# Patient Record
Sex: FEMALE | Race: WHITE | NOT HISPANIC OR LATINO | Employment: OTHER | URBAN - METROPOLITAN AREA
[De-identification: names, ages, dates, MRNs, and addresses within clinical notes are randomized per-mention and may not be internally consistent; named-entity substitution may affect disease eponyms.]

---

## 2017-01-16 ENCOUNTER — TRANSFERRED RECORDS (OUTPATIENT)
Dept: HEALTH INFORMATION MANAGEMENT | Facility: CLINIC | Age: 67
End: 2017-01-16

## 2017-01-23 ENCOUNTER — TRANSFERRED RECORDS (OUTPATIENT)
Dept: HEALTH INFORMATION MANAGEMENT | Facility: CLINIC | Age: 67
End: 2017-01-23

## 2017-01-27 ENCOUNTER — TRANSFERRED RECORDS (OUTPATIENT)
Dept: HEALTH INFORMATION MANAGEMENT | Facility: CLINIC | Age: 67
End: 2017-01-27

## 2017-06-26 ENCOUNTER — TRANSFERRED RECORDS (OUTPATIENT)
Dept: HEALTH INFORMATION MANAGEMENT | Facility: CLINIC | Age: 67
End: 2017-06-26

## 2017-06-27 ENCOUNTER — TRANSFERRED RECORDS (OUTPATIENT)
Dept: HEALTH INFORMATION MANAGEMENT | Facility: CLINIC | Age: 67
End: 2017-06-27

## 2017-08-21 ENCOUNTER — OFFICE VISIT (OUTPATIENT)
Dept: INTERNAL MEDICINE | Facility: CLINIC | Age: 67
End: 2017-08-21
Payer: COMMERCIAL

## 2017-08-21 VITALS
OXYGEN SATURATION: 98 % | TEMPERATURE: 97.8 F | RESPIRATION RATE: 16 BRPM | HEART RATE: 80 BPM | DIASTOLIC BLOOD PRESSURE: 62 MMHG | BODY MASS INDEX: 24.8 KG/M2 | HEIGHT: 63 IN | SYSTOLIC BLOOD PRESSURE: 98 MMHG | WEIGHT: 140 LBS

## 2017-08-21 DIAGNOSIS — Z00.00 ENCOUNTER FOR ROUTINE ADULT HEALTH EXAMINATION WITHOUT ABNORMAL FINDINGS: Primary | ICD-10-CM

## 2017-08-21 DIAGNOSIS — G43.009 NONINTRACTABLE MIGRAINE, UNSPECIFIED MIGRAINE TYPE: ICD-10-CM

## 2017-08-21 LAB
ANION GAP SERPL CALCULATED.3IONS-SCNC: 9 MMOL/L (ref 3–14)
BUN SERPL-MCNC: 13 MG/DL (ref 7–30)
CALCIUM SERPL-MCNC: 9.4 MG/DL (ref 8.5–10.1)
CHLORIDE SERPL-SCNC: 107 MMOL/L (ref 94–109)
CHOLEST SERPL-MCNC: 190 MG/DL
CO2 SERPL-SCNC: 23 MMOL/L (ref 20–32)
CREAT SERPL-MCNC: 0.79 MG/DL (ref 0.52–1.04)
GFR SERPL CREATININE-BSD FRML MDRD: 73 ML/MIN/1.7M2
GLUCOSE SERPL-MCNC: 102 MG/DL (ref 70–99)
HDLC SERPL-MCNC: 73 MG/DL
LDLC SERPL CALC-MCNC: 102 MG/DL
NONHDLC SERPL-MCNC: 117 MG/DL
POTASSIUM SERPL-SCNC: 4 MMOL/L (ref 3.4–5.3)
SODIUM SERPL-SCNC: 139 MMOL/L (ref 133–144)
TRIGL SERPL-MCNC: 74 MG/DL

## 2017-08-21 PROCEDURE — 36415 COLL VENOUS BLD VENIPUNCTURE: CPT | Performed by: INTERNAL MEDICINE

## 2017-08-21 PROCEDURE — 99396 PREV VISIT EST AGE 40-64: CPT | Performed by: INTERNAL MEDICINE

## 2017-08-21 PROCEDURE — 80048 BASIC METABOLIC PNL TOTAL CA: CPT | Performed by: INTERNAL MEDICINE

## 2017-08-21 PROCEDURE — 80061 LIPID PANEL: CPT | Performed by: INTERNAL MEDICINE

## 2017-08-21 RX ORDER — SUMATRIPTAN 50 MG/1
50 TABLET, FILM COATED ORAL
Qty: 18 TABLET | Refills: 11 | Status: SHIPPED | OUTPATIENT
Start: 2017-08-21 | End: 2021-04-22

## 2017-08-21 NOTE — MR AVS SNAPSHOT
After Visit Summary   8/21/2017    Vilma Hughes    MRN: [id number]           Patient Information     Date Of Birth          1950        Visit Information        Provider Department      8/21/2017 7:40 AM Vilma Grady MD [facility]        Today's Diagnoses     Encounter for routine adult health examination without abnormal findings    -  1      Care Instructions      PREVENTIVE HEALTH RECOMMENDATIONS:     Vaccines: Get a flu shot each year. Get a tetanus shot every 10 years.     Exercise for at least 150 minutes a week (an average of 30 minutes a day, 5 days of the week). This will help you control your weight and prevent disease.    Limit alcohol to one drink per day.    No smoking.     Wear sunscreen to prevent skin cancer.     See your dentist twice a year for an exam and cleaning.    Try to get Calcium 1200 mg total per day. It is best to not take it all at once. Try to get Vitamin D at least 1000 units per day.    BMI or Body Mass Index is a way of indicating weight and health risk for cardiovascular diseases, high blood pressure, diabetes.   Definitions:    Underweight is less than 18.5 and will be associated with health risk.   Normal BMI is 18.5 to 25   Overweight is 25-29   Obesity is 30 or greater   Morbid Obesity is 40 or greater or 35 or greater with diabetes or high blood pressure  Obesity and Morbid Obesity are associated with higher health risks. Lowering calories, exercising more may lower your BMI and even small decreases can have positive impact on lowering health risks.   Your Body mass index is 24.8 kg/(m^2)..             Follow-ups after your visit        Who to contact     If you have questions or need follow up information about today's clinic visit or your schedule please contact [facility] directly at [phone].  Normal or non-critical lab and imaging results will be communicated to you by MyChart, letter or phone within 4 business  "days after the clinic has received the results. If you do not hear from us within 7 days, please contact the clinic through [employer] or phone. If you have a critical or abnormal lab result, we will notify you by phone as soon as possible.  Submit refill requests through Bi[employer] or call your pharmacy and they will forward the refill request to us. Please allow 3 business days for your refill to be completed.          Additional Information About Your Visit        Bioptigen Information     [employer]igen lets you send messages to your doctor, view your test results, renew your prescriptions, schedule appointments and more. To sign up, go to [email or web address] . Click on \"Log in\" on the left side of the screen, which will take you to the Welcome page. Then click on \"Sign up Now\" on the right side of the page.     You will be asked to enter the access code listed below, as well as some personal information. Please follow the directions to create your username and password.     Your access code is: [email or web address]  Expires: 2017  8:11 AM     Your access code will  in 90 days. If you need help or a new code, please call your [place] clinic or [phone].        Care EveryWhere ID     This is your Care EveryWhere ID. This could be used by other organizations to access your [place] medical records  [id number]        Your Vitals Were     Pulse Temperature Respirations Height Last Period Pulse Oximetry    80 97.8  F (36.6  C) (Oral) 16 5' 3\" (1.6 m) 2004 98%    BMI (Body Mass Index)                   24.8 kg/m2            Blood Pressure from Last 3 Encounters:   17 98/62   16 116/72   16 117/63    Weight from Last 3 Encounters:   17 140 lb (63.5 kg)   16 142 lb (64.4 kg)   16 138 lb (62.6 kg)              We Performed the Following     Basic metabolic panel     Lipid panel reflex to direct LDL        Primary Care Provider Office Phone # Fax #    Vilma Grady [phone] " [phone]       [address] [facility] [address]  [facility] [address]        Equal Access to Services     JENNIFERTEJA MERVAT : Hadii aad ku hadjuan pabloo Soannabelali, waaxda luqadaha, qaybta kaalmada adejose, víctor claudiain hayaaniin sonjermaine joiner patti lawrenec. So [facility] [phone].    ATENCIÓN: Si habla español, tiene a waldron disposición servicios gratuitos de asistencia lingüística. Llame al [phone].    We comply with applicable federal civil rights laws and [place] laws. We do not discriminate on the basis of race, color, national origin, age, disability sex, sexual orientation or gender identity.            Thank you!     Thank you for choosing [facility]  for your care. Our goal is always to provide you with excellent care. Hearing back from our patients is one way we can continue to improve our services. Please take a few minutes to complete the written survey that you may receive in the mail after your visit with us. Thank you!             Your Updated Medication List - Protect others around you: Learn how to safely use, store and throw away your medicines at www.disposemymeds.org.          This list is accurate as of: 8/21/17  8:11 AM.  Always use your most recent med list.                   Brand Name Dispense Instructions for use Diagnosis    SUMAtriptan 50 MG tablet    IMITREX    18 tablet    Take 1 tablet (50 mg) by mouth at onset of headache for migraine May repeat dose in 2 hours.  Do not exceed 200 mg in 24 hours    Nonintractable migraine, unspecified migraine type
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
## 2017-08-21 NOTE — PATIENT INSTRUCTIONS
PREVENTIVE HEALTH RECOMMENDATIONS:     Vaccines: Get a flu shot each year. Get a tetanus shot every 10 years.     Exercise for at least 150 minutes a week (an average of 30 minutes a day, 5 days of the week). This will help you control your weight and prevent disease.    Limit alcohol to one drink per day.    No smoking.     Wear sunscreen to prevent skin cancer.     See your dentist twice a year for an exam and cleaning.    Try to get Calcium 1200 mg total per day. It is best to not take it all at once. Try to get Vitamin D at least 1000 units per day.    BMI or Body Mass Index is a way of indicating weight and health risk for cardiovascular diseases, high blood pressure, diabetes.   Definitions:    Underweight is less than 18.5 and will be associated with health risk.   Normal BMI is 18.5 to 25   Overweight is 25-29   Obesity is 30 or greater   Morbid Obesity is 40 or greater or 35 or greater with diabetes or high blood pressure  Obesity and Morbid Obesity are associated with higher health risks. Lowering calories, exercising more may lower your BMI and even small decreases can have positive impact on lowering health risks.   Your Body mass index is 24.8 kg/(m^2)..

## 2017-08-21 NOTE — NURSING NOTE
"Chief Complaint   Patient presents with     Physical     Fasting       Initial BP 98/62  Pulse 80  Temp 97.8  F (36.6  C) (Oral)  Resp 16  Ht 5' 3\" (1.6 m)  Wt 140 lb (63.5 kg)  LMP 08/18/2004  SpO2 98%  BMI 24.8 kg/m2 Estimated body mass index is 24.8 kg/(m^2) as calculated from the following:    Height as of this encounter: 5' 3\" (1.6 m).    Weight as of this encounter: 140 lb (63.5 kg).  Medication Reconciliation: complete      Cristine Allen CMA      "

## 2018-01-03 ENCOUNTER — TELEPHONE (OUTPATIENT)
Dept: INTERNAL MEDICINE | Facility: CLINIC | Age: 68
End: 2018-01-03

## 2018-01-03 DIAGNOSIS — B37.31 YEAST INFECTION OF THE VAGINA: Primary | ICD-10-CM

## 2018-01-03 NOTE — TELEPHONE ENCOUNTER
Vilma Hughes is a 67 year old female who calls with yeast infection.    NURSING ASSESSMENT:  Description:  Vaginal burning, itching, and odor  Precip. factors:  Has been at dentist frequently recently and takes antibx for each visit  Last exam/Treatment:  08/21/17  Allergies:   Allergies   Allergen Reactions     Fentanyl Nausea and Vomiting     No Known Drug Allergies        MEDICATIONS:   Recommends pt start with OTC yeast infection products. Pt reports she'd like the oral treatment of yeast infection. Pt indicates she's had yeast infections in the past and received po treatment then.    NURSING PLAN: Routed to provider Yes     Discuss it may be later today before getting a call back. Pt voices she may go to [facility] if it's bothering her and she hasn't heard back yet. Discuss clinic policy is moving towards more e-visits and [email or web address] use vs calling in rxs. Verbalized understanding.    If further questions/concerns or if symptoms do not improve, worsen or new symptoms develop, call your PCP or [facility] as soon as possible.      Guideline used:  Telephone Triage Protocols for Nurses, Fourth Edition, Keisha Garcia RN

## 2018-01-03 NOTE — TELEPHONE ENCOUNTER
Suggest OTC topical treatment first. If not improved then needs check in clinic and PO according to result / wet prep.

## 2018-01-04 RX ORDER — FLUCONAZOLE 150 MG/1
150 TABLET ORAL ONCE
Qty: 1 TABLET | Refills: 0 | Status: SHIPPED | OUTPATIENT
Start: 2018-01-04 | End: 2018-01-18

## 2018-01-18 ENCOUNTER — TELEPHONE (OUTPATIENT)
Dept: INTERNAL MEDICINE | Facility: CLINIC | Age: 68
End: 2018-01-18

## 2018-01-18 DIAGNOSIS — B37.31 YEAST INFECTION OF THE VAGINA: ICD-10-CM

## 2018-01-18 RX ORDER — FLUCONAZOLE 150 MG/1
150 TABLET ORAL ONCE
Qty: 1 TABLET | Refills: 0 | Status: SHIPPED | OUTPATIENT
Start: 2018-01-18 | End: 2018-01-18

## 2018-01-18 NOTE — TELEPHONE ENCOUNTER
Pt left message that she had to take more antibiotics prescribed by the dentist.     Now she has another vaginal yeast infection.     She is asking for refill of Fluconazole Rx.     Last refill on 1/4/18.     Provider approval needed.

## 2018-02-02 ENCOUNTER — OFFICE VISIT (OUTPATIENT)
Dept: INTERNAL MEDICINE | Facility: CLINIC | Age: 68
End: 2018-02-02
Payer: COMMERCIAL

## 2018-02-02 VITALS
SYSTOLIC BLOOD PRESSURE: 126 MMHG | OXYGEN SATURATION: 97 % | HEART RATE: 80 BPM | BODY MASS INDEX: 25.5 KG/M2 | WEIGHT: 143.9 LBS | TEMPERATURE: 97.8 F | HEIGHT: 63 IN | DIASTOLIC BLOOD PRESSURE: 74 MMHG

## 2018-02-02 DIAGNOSIS — N95.2 ATROPHIC VAGINITIS: ICD-10-CM

## 2018-02-02 DIAGNOSIS — N76.0 VAGINITIS AND VULVOVAGINITIS: Primary | ICD-10-CM

## 2018-02-02 LAB
SPECIMEN SOURCE: NORMAL
WET PREP SPEC: NORMAL

## 2018-02-02 PROCEDURE — 87210 SMEAR WET MOUNT SALINE/INK: CPT | Performed by: INTERNAL MEDICINE

## 2018-02-02 PROCEDURE — 99213 OFFICE O/P EST LOW 20 MIN: CPT | Performed by: INTERNAL MEDICINE

## 2018-02-02 RX ORDER — ESTRADIOL 0.1 MG/G
2 CREAM VAGINAL
Qty: 42.5 G | Refills: 3 | Status: SHIPPED | OUTPATIENT
Start: 2018-02-05 | End: 2019-06-25

## 2018-02-02 NOTE — PROGRESS NOTES
"  SUBJECTIVE:   Vilma Hughes is a 67 year old female who presents to clinic today for the following health issues:    Vaginal irritation: She had been on some antibiotics on and off for some dental issues and had yeast infection that was treated couple times.  She has some continued irritation and is concerned she may still have infection.  She reports that she has had some sensitivity with these tissues including with intercourse.    She states a number of years ago she had some vaginal bleeding and was seen by gynecology, they had been monitoring some lesion in the vagina, eventually it went away and there was no follow-up.  Review of the records reveals she had had a shallow ulcer in the vagina, thought possibly traumatic.  A biopsy was done that showed some chronic inflammation but no other abnormalities.    Patient Active Problem List   Diagnosis     Diffuse cystic mastopathy     Ebstein's anomaly     Advanced directives, counseling/discussion     Nonintractable migraine, unspecified migraine type     .  Current Outpatient Prescriptions   Medication Sig Dispense Refill     SUMAtriptan (IMITREX) 50 MG tablet Take 1 tablet (50 mg) by mouth at onset of headache for migraine May repeat dose in 2 hours.  Do not exceed 200 mg in 24 hours 18 tablet 11      Social History   Substance Use Topics     Smoking status: Never Smoker     Smokeless tobacco: Never Used     Alcohol use Yes      Comment: occasionally      Reviewed and updated as needed this visit by clinical staff  Tobacco  Allergies  Meds  Problems  Med Hx  Surg Hx  Fam Hx  Soc Hx        Reviewed and updated as needed this visit by Provider         ROS:  No discharge    OBJECTIVE:     /74 (BP Location: Right arm, Patient Position: Sitting, Cuff Size: Adult Regular)  Pulse 80  Temp 97.8  F (36.6  C) (Oral)  Ht 5' 3\" (1.6 m)  Wt 143 lb 14.4 oz (65.3 kg)  LMP 08/18/2004  SpO2 97%  BMI 25.49 kg/m2  Body mass index is 25.49 kg/(m^2).    External " genitalia with very mild erythema, thinning vaginal mucosa without significant exudate present, no abnormal lesions, ulcers, bleeding.    Wet prep: Negative      ASSESSMENT/PLAN:             1. Vaginitis and vulvovaginitis    - Wet prep    2. Atrophic vaginitis  Reassured there was no infection.  She likely has atrophic vaginitis, discussed options including estradiol cream.  She would like to try this.  - estradiol (ESTRACE) 0.1 MG/GM cream; Place 2 g vaginally twice a week  Dispense: 42.5 g; Refill: 3        Vilma Grady MD  [facility]

## 2018-02-02 NOTE — NURSING NOTE
"Chief Complaint   Patient presents with     Vaginal Problem       Initial /74 (BP Location: Right arm, Patient Position: Sitting, Cuff Size: Adult Regular)  Pulse 80  Temp 97.8  F (36.6  C) (Oral)  Ht 5' 3\" (1.6 m)  Wt 143 lb 14.4 oz (65.3 kg)  LMP 08/18/2004  SpO2 97%  BMI 25.49 kg/m2 Estimated body mass index is 25.49 kg/(m^2) as calculated from the following:    Height as of this encounter: 5' 3\" (1.6 m).    Weight as of this encounter: 143 lb 14.4 oz (65.3 kg).  Medication Reconciliation: complete    "

## 2018-02-02 NOTE — MR AVS SNAPSHOT
"              After Visit Summary   2018    Vilma Hughes    MRN: [id number]           Patient Information     Date Of Birth          1950        Visit Information        Provider Department      2018 10:20 AM Vilma Grady MD [facility]        Today's Diagnoses     Vaginitis and vulvovaginitis    -  1    Atrophic vaginitis           Follow-ups after your visit        Who to contact     If you have questions or need follow up information about today's clinic visit or your schedule please contact [facility] directly at [phone].  Normal or non-critical lab and imaging results will be communicated to you by rankurhart, letter or phone within 4 business days after the clinic has received the results. If you do not hear from us within 7 days, please contact the clinic through rankurhart or phone. If you have a critical or abnormal lab result, we will notify you by phone as soon as possible.  Submit refill requests through [employer] or call your pharmacy and they will forward the refill request to us. Please allow 3 business days for your refill to be completed.          Additional Information About Your Visit        MyChart Information     W[employer] lets you send messages to your doctor, view your test results, renew your prescriptions, schedule appointments and more. To sign up, go to [email or web address] . Click on \"Log in\" on the left side of the screen, which will take you to the Welcome page. Then click on \"Sign up Now\" on the right side of the page.     You will be asked to enter the access code listed below, as well as some personal information. Please follow the directions to create your username and password.     Your access code is: [email or web address]  Expires: 2018 10:22 AM     Your access code will  in 90 days. If you need help or a new code, please call your [facility] or [phone].        Care EveryWhere ID     This is your Care EveryWhere ID. This " "could be used by other organizations to access your [place] medical records  [id number]        Your Vitals Were     Pulse Temperature Height Last Period Pulse Oximetry BMI (Body Mass Index)    80 97.8  F (36.6  C) (Oral) 5' 3\" (1.6 m) 08/18/2004 97% 25.49 kg/m2       Blood Pressure from Last 3 Encounters:   02/02/18 126/74   08/21/17 98/62   08/19/16 116/72    Weight from Last 3 Encounters:   02/02/18 143 lb 14.4 oz (65.3 kg)   08/21/17 140 lb (63.5 kg)   08/19/16 142 lb (64.4 kg)              We Performed the Following     Wet prep          Today's Medication Changes          These changes are accurate as of 2/2/18 11:59 PM.  If you have any questions, ask your nurse or doctor.               Start taking these medicines.        Dose/Directions    estradiol 0.1 MG/GM cream   Commonly known as:  ESTRACE   Used for:  Atrophic vaginitis   Started by:  Vilma Grady MD        Dose:  2 g   Start taking on:  2/5/2018   Place 2 g vaginally twice a week   Quantity:  42.5 g   Refills:  3            Where to get your medicines      These medications were sent to [facility]/pharmacy [facility][id number] [facility] [address]  [address] [facility] [address]     Phone:  [phone]     estradiol 0.1 MG/GM cream                Primary Care Provider Office Phone # Fax #    Vilma Grady [phone]       [facility] [address] [facility] [address]  [facility] [address]        Equal Access to Services     [facility]: Hadii shahid hilliardo Sorosemary, waaxda luqadaha, qaybta kaalmada adeegyada, waxay idiin hayaan adeeg kharash la'aan . So [facility] [phone].    ATENCIÓN: Si habla español, tiene a waldron disposición servicios gratuitos de asistencia lingüística. Llame al [phone].    We comply with applicable federal civil rights laws and [place] laws. We do not discriminate on the basis of race, color, national origin, age, disability, sex, sexual orientation, or gender identity.            Thank you!     Thank you for " choosing [facility]  for your care. Our goal is always to provide you with excellent care. Hearing back from our patients is one way we can continue to improve our services. Please take a few minutes to complete the written survey that you may receive in the mail after your visit with us. Thank you!             Your Updated Medication List - Protect others around you: Learn how to safely use, store and throw away your medicines at www.disposemymeds.org.          This list is accurate as of 2/2/18 11:59 PM.  Always use your most recent med list.                   Brand Name Dispense Instructions for use Diagnosis    estradiol 0.1 MG/GM cream   Start taking on:  2/5/2018    ESTRACE    42.5 g    Place 2 g vaginally twice a week    Atrophic vaginitis       SUMAtriptan 50 MG tablet    IMITREX    18 tablet    Take 1 tablet (50 mg) by mouth at onset of headache for migraine May repeat dose in 2 hours.  Do not exceed 200 mg in 24 hours    Nonintractable migraine, unspecified migraine type

## 2018-02-04 ENCOUNTER — TELEPHONE (OUTPATIENT)
Dept: INTERNAL MEDICINE | Facility: CLINIC | Age: 68
End: 2018-02-04

## 2018-02-04 ENCOUNTER — E-VISIT (OUTPATIENT)
Dept: INTERNAL MEDICINE | Facility: CLINIC | Age: 68
End: 2018-02-04
Payer: COMMERCIAL

## 2018-02-04 DIAGNOSIS — N39.0 URINARY TRACT INFECTION WITHOUT HEMATURIA, SITE UNSPECIFIED: Primary | ICD-10-CM

## 2018-02-04 PROCEDURE — 99444 ZZC PHYSICIAN ONLINE EVALUATION & MANAGEMENT SERVICE: CPT | Performed by: INTERNAL MEDICINE

## 2018-02-04 NOTE — TELEPHONE ENCOUNTER
Reason for call:  Symptom   Symptom or request: Patient has a UTI    Duration (how long have symptoms been present): Since Friday  Have you been treated for this before? No    Additional comments: Pt took to the test to prove she has the UTI. Leukocytes is the type of UTI she has. Pt asked can you please call in a prescription to the [facility] on file.    Phone number to reach patient:  Home number on file [phone] (home)    Best Time:  Anytime    Can we leave a detailed message on this number?  YES

## 2018-02-05 RX ORDER — NITROFURANTOIN 25; 75 MG/1; MG/1
100 CAPSULE ORAL 2 TIMES DAILY
Qty: 14 CAPSULE | Refills: 0 | Status: SHIPPED | OUTPATIENT
Start: 2018-02-05 | End: 2018-06-07

## 2018-02-27 ENCOUNTER — TRANSFERRED RECORDS (OUTPATIENT)
Dept: HEALTH INFORMATION MANAGEMENT | Facility: CLINIC | Age: 68
End: 2018-02-27

## 2018-04-30 ENCOUNTER — TRANSFERRED RECORDS (OUTPATIENT)
Dept: HEALTH INFORMATION MANAGEMENT | Facility: CLINIC | Age: 68
End: 2018-04-30

## 2018-06-07 ENCOUNTER — OFFICE VISIT (OUTPATIENT)
Dept: INTERNAL MEDICINE | Facility: CLINIC | Age: 68
End: 2018-06-07
Payer: COMMERCIAL

## 2018-06-07 VITALS
DIASTOLIC BLOOD PRESSURE: 62 MMHG | HEART RATE: 79 BPM | HEIGHT: 63 IN | SYSTOLIC BLOOD PRESSURE: 98 MMHG | OXYGEN SATURATION: 100 % | BODY MASS INDEX: 25.78 KG/M2 | TEMPERATURE: 97.7 F | RESPIRATION RATE: 16 BRPM | WEIGHT: 145.5 LBS

## 2018-06-07 DIAGNOSIS — Z23 PNEUMOCOCCAL VACCINATION ADMINISTERED AT CURRENT VISIT: ICD-10-CM

## 2018-06-07 DIAGNOSIS — R39.11 URINARY HESITANCY: ICD-10-CM

## 2018-06-07 DIAGNOSIS — Z13.1 SCREENING FOR DIABETES MELLITUS: ICD-10-CM

## 2018-06-07 DIAGNOSIS — Z00.00 ENCOUNTER FOR ROUTINE ADULT HEALTH EXAMINATION WITHOUT ABNORMAL FINDINGS: Primary | ICD-10-CM

## 2018-06-07 DIAGNOSIS — Z13.6 CARDIOVASCULAR SCREENING; LDL GOAL LESS THAN 130: ICD-10-CM

## 2018-06-07 LAB
ALBUMIN UR-MCNC: NEGATIVE MG/DL
ANION GAP SERPL CALCULATED.3IONS-SCNC: 5 MMOL/L (ref 3–14)
APPEARANCE UR: CLEAR
BACTERIA #/AREA URNS HPF: ABNORMAL /HPF
BILIRUB UR QL STRIP: NEGATIVE
BUN SERPL-MCNC: 12 MG/DL (ref 7–30)
CALCIUM SERPL-MCNC: 9.3 MG/DL (ref 8.5–10.1)
CHLORIDE SERPL-SCNC: 107 MMOL/L (ref 94–109)
CHOLEST SERPL-MCNC: 196 MG/DL
CO2 SERPL-SCNC: 27 MMOL/L (ref 20–32)
COLOR UR AUTO: YELLOW
CREAT SERPL-MCNC: 0.84 MG/DL (ref 0.52–1.04)
GFR SERPL CREATININE-BSD FRML MDRD: 68 ML/MIN/1.7M2
GLUCOSE SERPL-MCNC: 105 MG/DL (ref 70–99)
GLUCOSE UR STRIP-MCNC: NEGATIVE MG/DL
HDLC SERPL-MCNC: 64 MG/DL
HGB UR QL STRIP: ABNORMAL
KETONES UR STRIP-MCNC: NEGATIVE MG/DL
LDLC SERPL CALC-MCNC: 121 MG/DL
LEUKOCYTE ESTERASE UR QL STRIP: NEGATIVE
NITRATE UR QL: NEGATIVE
NONHDLC SERPL-MCNC: 132 MG/DL
PH UR STRIP: 6.5 PH (ref 5–7)
POTASSIUM SERPL-SCNC: 4.9 MMOL/L (ref 3.4–5.3)
RBC #/AREA URNS AUTO: ABNORMAL /HPF
SODIUM SERPL-SCNC: 139 MMOL/L (ref 133–144)
SOURCE: ABNORMAL
SP GR UR STRIP: <=1.005 (ref 1–1.03)
SPECIMEN SOURCE: NORMAL
TRIGL SERPL-MCNC: 57 MG/DL
UROBILINOGEN UR STRIP-ACNC: 0.2 EU/DL (ref 0.2–1)
WBC #/AREA URNS AUTO: ABNORMAL /HPF
WET PREP SPEC: NORMAL

## 2018-06-07 PROCEDURE — 99397 PER PM REEVAL EST PAT 65+ YR: CPT | Mod: 25 | Performed by: INTERNAL MEDICINE

## 2018-06-07 PROCEDURE — 87210 SMEAR WET MOUNT SALINE/INK: CPT | Performed by: INTERNAL MEDICINE

## 2018-06-07 PROCEDURE — 90471 IMMUNIZATION ADMIN: CPT | Performed by: INTERNAL MEDICINE

## 2018-06-07 PROCEDURE — 81001 URINALYSIS AUTO W/SCOPE: CPT | Performed by: INTERNAL MEDICINE

## 2018-06-07 PROCEDURE — 80061 LIPID PANEL: CPT | Performed by: INTERNAL MEDICINE

## 2018-06-07 PROCEDURE — 36415 COLL VENOUS BLD VENIPUNCTURE: CPT | Performed by: INTERNAL MEDICINE

## 2018-06-07 PROCEDURE — 90670 PCV13 VACCINE IM: CPT | Performed by: INTERNAL MEDICINE

## 2018-06-07 PROCEDURE — 80048 BASIC METABOLIC PNL TOTAL CA: CPT | Performed by: INTERNAL MEDICINE

## 2018-06-07 ASSESSMENT — ACTIVITIES OF DAILY LIVING (ADL)
CURRENT_FUNCTION: NO ASSISTANCE NEEDED
I_NEED_ASSISTANCE_FOR_THE_FOLLOWING_DAILY_ACTIVITIES:: NO ASSISTANCE IS NEEDED

## 2018-06-07 NOTE — NURSING NOTE
"BP 98/62  Pulse 79  Temp 97.7  F (36.5  C) (Oral)  Resp 16  Ht 5' 3\" (1.6 m)  Wt 145 lb 8 oz (66 kg)  LMP 08/18/2004  SpO2 100%  BMI 25.77 kg/m2    "

## 2018-06-07 NOTE — MR AVS SNAPSHOT
After Visit Summary   6/7/2018    Vilma Hughes    MRN: [id number]           Patient Information     Date Of Birth          1950        Visit Information        Provider Department      6/7/2018 7:40 AM Vilma Grady MD [facility]        Today's Diagnoses     Encounter for routine adult health examination without abnormal findings    -  1    Urinary hesitancy        Screening for diabetes mellitus        CARDIOVASCULAR SCREENING; LDL GOAL LESS THAN 130          Care Instructions    Shingrix is the new shingles vaccine.       PREVENTIVE HEALTH RECOMMENDATIONS:     Vaccines: Get a flu shot each year. Get a tetanus shot every 10 years.     Exercise for at least 150 minutes a week (an average of 30 minutes a day, 5 days of the week). This will help you control your weight and prevent disease.    Limit alcohol to one drink per day.    No smoking.     Wear sunscreen to prevent skin cancer.     See your dentist twice a year for an exam and cleaning.    Try to get Calcium 1200 mg total per day. It is best to not take it all at once. Try to get Vitamin D at least 7809-5647 units per day.    BMI or Body Mass Index is a way of indicating weight and health risk for cardiovascular diseases, high blood pressure, diabetes.   Definitions:    Underweight is less than 18.5 and will be associated with health risk.   Normal BMI is 18.5 to 25   Overweight is 25-29   Obesity is 30 or greater   Morbid Obesity is 40 or greater or 35 or greater with diabetes, prediabetes or abnormal blood sugar, high blood pressure or elevated cholesterol  Obesity and Morbid Obesity are associated with higher health risks. Lowering calories, exercising more may lower your BMI and even small decreases can have positive impact on lowering health risks.   Your Body mass index is 25.77 kg/(m^2)..,              Follow-ups after your visit        Who to contact     If you have questions or need follow up information about  "today's clinic visit or your schedule please contact [facility] directly at [phone].  Normal or non-critical lab and imaging results will be communicated to you by MyChart, letter or phone within 4 business days after the clinic has received the results. If you do not hear from us within 7 days, please contact the clinic through [employer] or phone. If you have a critical or abnormal lab result, we will notify you by phone as soon as possible.  Submit refill requests through [employer] or call your pharmacy and they will forward the refill request to us. Please allow 3 business days for your refill to be completed.          Additional Information About Your Visit        [employer] Information     [employer] gives you secure access to your electronic health record. If you see a primary care provider, you can also send messages to your care team and make appointments. If you have questions, please call your primary care clinic.  If you do not have a primary care provider, please call [phone] and they will assist you.        Care EveryWhere ID     This is your Care EveryWhere ID. This could be used by other organizations to access your [facility] medical records  [id number]        Your Vitals Were     Pulse Temperature Respirations Height Last Period Pulse Oximetry    79 97.7  F (36.5  C) (Oral) 16 5' 3\" (1.6 m) 08/18/2004 100%    BMI (Body Mass Index)                   25.77 kg/m2            Blood Pressure from Last 3 Encounters:   06/07/18 98/62   02/02/18 126/74   08/21/17 98/62    Weight from Last 3 Encounters:   06/07/18 145 lb 8 oz (66 kg)   02/02/18 143 lb 14.4 oz (65.3 kg)   08/21/17 140 lb (63.5 kg)              We Performed the Following     Basic metabolic panel     Lipid panel reflex to direct LDL Fasting     UA reflex to Microscopic and Culture     Wet prep          Today's Medication Changes          These changes are accurate as of 6/7/18  8:20 AM.  If you have any questions, ask your " nurse or doctor.               Stop taking these medicines if you haven't already. Please contact your care team if you have questions.     nitroFURantoin (macrocrystal-monohydrate) 100 MG capsule   Commonly known as:  MACROBID   Stopped by:  Vilma Grady MD                    Primary Care Provider Office Phone # Fax #    Vilma Grady [phone]       [address] [facility] [address]  [facility] [address]        Equal Access to Services     [facility]: Hadii aad ku hadasho Soomaali, waaxda luqadaha, qaybta kaalmada adeegyada, waxay idiin hayaan adeeg kharash la'violettan . So [facility] [phone].    ATENCIÓN: Si habla español, tiene a waldron disposición servicios gratuitos de asistencia lingüística. [facility] [phone].    We comply with applicable federal civil rights laws and [place] laws. We do not discriminate on the basis of race, color, national origin, age, disability, sex, sexual orientation, or gender identity.            Thank you!     Thank you for choosing [facility]  for your care. Our goal is always to provide you with excellent care. Hearing back from our patients is one way we can continue to improve our services. Please take a few minutes to complete the written survey that you may receive in the mail after your visit with us. Thank you!             Your Updated Medication List - Protect others around you: Learn how to safely use, store and throw away your medicines at www.disposemymeds.org.          This list is accurate as of 6/7/18  8:20 AM.  Always use your most recent med list.                   Brand Name Dispense Instructions for use Diagnosis    estradiol 0.1 MG/GM cream    ESTRACE    42.5 g    Place 2 g vaginally twice a week    Atrophic vaginitis       SUMAtriptan 50 MG tablet    IMITREX    18 tablet    Take 1 tablet (50 mg) by mouth at onset of headache for migraine May repeat dose in 2 hours.  Do not exceed 200 mg in 24 hours    Nonintractable migraine, unspecified  migraine type

## 2018-06-07 NOTE — PATIENT INSTRUCTIONS
Shingrix is the new shingles vaccine.       PREVENTIVE HEALTH RECOMMENDATIONS:     Vaccines: Get a flu shot each year. Get a tetanus shot every 10 years.     Exercise for at least 150 minutes a week (an average of 30 minutes a day, 5 days of the week). This will help you control your weight and prevent disease.    Limit alcohol to one drink per day.    No smoking.     Wear sunscreen to prevent skin cancer.     See your dentist twice a year for an exam and cleaning.    Try to get Calcium 1200 mg total per day. It is best to not take it all at once. Try to get Vitamin D at least 9111-9460 units per day.    BMI or Body Mass Index is a way of indicating weight and health risk for cardiovascular diseases, high blood pressure, diabetes.   Definitions:    Underweight is less than 18.5 and will be associated with health risk.   Normal BMI is 18.5 to 25   Overweight is 25-29   Obesity is 30 or greater   Morbid Obesity is 40 or greater or 35 or greater with diabetes, prediabetes or abnormal blood sugar, high blood pressure or elevated cholesterol  Obesity and Morbid Obesity are associated with higher health risks. Lowering calories, exercising more may lower your BMI and even small decreases can have positive impact on lowering health risks.   Your Body mass index is 25.77 kg/(m^2)..,

## 2018-06-07 NOTE — PROGRESS NOTES
SUBJECTIVE:   Vilma Hughes is a 67 year old female who presents for Preventive Visit.  Are you in the first 12 months of your Medicare coverage?  No    Physical   Annual:     Getting at least 3 servings of Calcium per day::  Yes    Bi-annual eye exam::  Yes    Dental care twice a year::  Yes    Sleep apnea or symptoms of sleep apnea::  Daytime drowsiness    Diet::  Regular (no restrictions)    Taking medications regularly::  Yes    Medication side effects::  Not applicable    Additional concerns today::  No    Ability to successfully perform activities of daily living: no assistance needed  Home Safety:  Throw rugs in the hallway and lack of grab bars in the bathroom  Hearing Impairment: feel that people are mumbling or not speaking clearly    Current concerns:   1. Having some     Fall risk:  Fallen 2 or more times in the past year?: No  Any fall with injury in the past year?: No    COGNITIVE SCREEN  1) Repeat 3 items (Banana, Sunrise, Chair)    2) Clock draw: ABNORMAL first place numbers incorrectly and then pelon a new clock but place the hands at 11:55.  3) 3 item recall: Recalls 2 objects   Results: ABNORMAL clock, 1-2 items recalled: PROBABLE COGNITIVE IMPAIRMENT, **INFORM PROVIDER**    Mini-CogTM Copyright S Derek. Licensed by the author for use in [facility]; reprinted with permission (soob@.[facility]). All rights reserved.      Reviewed, no concerns     Reviewed and updated as needed this visit by clinical staff         Reviewed and updated as needed this visit by Provider        Social History   Substance Use Topics     Smoking status: Never Smoker     Smokeless tobacco: Never Used     Alcohol use Yes      Comment: occasionally       Alcohol Use 6/7/2018   If you drink alcohol do you typically have greater than 3 drinks per day OR greater than 7 drinks per week? No   No flowsheet data found.          Today's PHQ-2 Score:   PHQ-2 ( 1999 Pfizer) 6/7/2018   Q1: Little interest or pleasure in doing  things 0   Q2: Feeling down, depressed or hopeless 0   PHQ-2 Score 0   Q1: Little interest or pleasure in doing things Not at all   Q2: Feeling down, depressed or hopeless Not at all   PHQ-2 Score 0       Do you feel safe in your environment - Yes    Do you have a Health Care Directive?: No: Advance care planning was reviewed with patient; patient declined at this time.    Current providers sharing in care for this patient include:   Patient Care Team:  Vilma Grady MD as PCP - General (Internal Medicine)    The following health maintenance items are reviewed in Epic and correct as of today:  Health Maintenance   Topic Date Due     ADVANCE DIRECTIVE PLANNING Q5 YRS  08/14/2017     PNEUMOCOCCAL (1 of 2 - PCV13) 11/17/2017     FALL RISK ASSESSMENT  02/02/2019     MAMMO Q2 YR  06/26/2019     TETANUS IMMUNIZATION (SYSTEM ASSIGNED)  01/10/2021     LIPID SCREEN Q5 YR FEMALE (SYSTEM ASSIGNED)  08/21/2022     COLONOSCOPY Q5 YR  04/30/2023     MIGRAINE ACTION PLAN  Completed     DEXA SCAN SCREENING (SYSTEM ASSIGNED)  Completed     HIV SCREEN (SYSTEM ASSIGNED)  Addressed     INFLUENZA VACCINE  Completed     HEPATITIS C SCREENING  Completed       Review of Systems    Physical Exam    Patient Active Problem List   Diagnosis     Diffuse cystic mastopathy     Ebstein's anomaly     Advanced directives, counseling/discussion     Nonintractable migraine, unspecified migraine type     Current Outpatient Prescriptions   Medication Sig Dispense Refill     estradiol (ESTRACE) 0.1 MG/GM cream Place 2 g vaginally twice a week (Patient not taking: Reported on 6/7/2018) 42.5 g 3     SUMAtriptan (IMITREX) 50 MG tablet Take 1 tablet (50 mg) by mouth at onset of headache for migraine May repeat dose in 2 hours.  Do not exceed 200 mg in 24 hours 18 tablet 11        Review Of Systems  Skin: negative, sees derm  Eyes: negative  Ears/Nose/Throat: negative  Respiratory: No dyspnea on exertion and No cough  Cardiovascular:  "negative  Gastrointestinal: negative  Genitourinary: as above  Musculoskeletal: negative  Neurologic: negative  Psychiatric: negative  Hematologic/Lymphatic/Immunologic: negative  Endocrine: negative   Gynecologic ros reveals:no breast pain or new or enlarging lumps on self exam, no vaginal bleeding, no discharge or pelvic pain    Objective:  Patient alert, in no acute distress  BP 98/62  Pulse 79  Temp 97.7  F (36.5  C) (Oral)  Resp 16  Ht 5' 3\" (1.6 m)  Wt 145 lb 8 oz (66 kg)  LMP 08/18/2004  SpO2 100%  BMI 25.77 kg/m2    HEENT: extraocular movements are intact, pupils equal and reactive to light and accommodation, TMs clear, oropharynx clear  NECK: Neck supple. No adenopathy. Thyroid symmetric, normal size,, Carotids without bruits.  PULMONARY: clear to auscultation  CARDIAC: regular rate and rhythm and no murmurs, clicks, or gallops  PULSES: 2/2 throughout  BACK: no spinal or CVAT  ABDOMINAL: Soft, nontender.  Normal bowel sounds.  No hepatosplenomegaly or abnormal masses  BREAST: No breast masses or tenderness, No axillary masses or tenderness and No galactorrhea  PELVIC: external genitalia with some atrophy, vaginal mucosa  thin, no changes at urethra, cervix normal, specimen sent for wet prep  REFLEXES: 2+ throughout  SKIN: unremarkable         ASSESSMENT / PLAN:   1. Encounter for routine adult health examination without abnormal findings    - Urine Microscopic    2. Urinary hesitancy  Check labs, consider urology  - UA reflex to Microscopic and Culture  - Wet prep    3. Screening for diabetes mellitus    - Basic metabolic panel    4. CARDIOVASCULAR SCREENING; LDL GOAL LESS THAN 130    - Lipid panel reflex to direct LDL Fasting    End of Life Planning:  Patient currently has an advanced directive: No.  I have verified the patient's ablity to prepare an advanced directive/make health care decisions.  Literature was provided to assist patient in preparing an advanced directive.    COUNSELING:  Reviewed " "preventive health counseling, as reflected in patient instructions        Estimated body mass index is 25.49 kg/(m^2) as calculated from the following:    Height as of 2/2/18: 5' 3\" (1.6 m).    Weight as of 2/2/18: 143 lb 14.4 oz (65.3 kg).     reports that she has never smoked. She has never used smokeless tobacco.      Appropriate preventive services were discussed with this patient, including applicable screening as appropriate for cardiovascular disease, diabetes, osteopenia/osteoporosis, and glaucoma.  As appropriate for age/gender, discussed screening for colorectal cancer, prostate cancer, breast cancer, and cervical cancer. Checklist reviewing preventive services available has been given to the patient.    Reviewed patients plan of care and provided an AVS. The Basic Care Plan (routine screening as documented in Health Maintenance) for Vilma meets the Care Plan requirement. This Care Plan has been established and reviewed with the Patient.    Counseling Resources:  ATP IV Guidelines  Pooled Cohorts Equation Calculator  Breast Cancer Risk Calculator  FRAX Risk Assessment  ICSI Preventive Guidelines  Dietary Guidelines for Americans, 2010  Pure [employer]utz's MyPlate  ASA Prophylaxis  Lung CA Screening    Vilma Grady MD  [facility]  Answers for HPI/ROS submitted by the patient on 6/7/2018   PHQ-2 Score: 0    "

## 2018-06-07 NOTE — PROGRESS NOTES
"  SUBJECTIVE:   Vilma Hughes is a 67 year old female who presents for Preventive Visit.  Are you in the first 12 months of your Medicare Part B coverage?  {No Yes:217079::\"No\"}    Healthy Habits:    Do you get at least three servings of calcium containing foods daily (dairy, green leafy vegetables, etc.)? {YES/NO, DAIRY INTAKE:869372::\"yes\"}    Amount of exercise or daily activities, outside of work: {AMOUNT EXERCISE:660680}    Problems taking medications regularly {Yes /No default:444532::\"No\"}    Medication side effects: {Yes /No default.:810799::\"No\"}    Have you had an eye exam in the past two years? {YESNOBLANK:370707}    Do you see a dentist twice per year? {YESNOBLANK:488178}    Do you have sleep apnea, excessive snoring or daytime drowsiness?{YESNOBLANK:715581}      Ability to successfully perform activities of daily living: {YES/NO (MEDICARE):893143::\"Yes, no assistance needed\"}    Home safety:  {IPPE SAFETY CONCERNS:553470::\"none identified\"}     Hearing impairment: {NO/YES:907022}    Fall risk:  {Document Fall Risk in the Assessments Section of the Navigator:187030}    {If any of the above assessments are answered yes, consider ordering appropriate referrals (Optional):819381::\"click delete button to remove this line now\"}    {AWV Cognitive Screenin}    {Outside tests to abstract? :720262}    {additional problems to add (Optional):626395}    Reviewed and updated as needed this visit by clinical staff         Reviewed and updated as needed this visit by Provider        Social History   Substance Use Topics     Smoking status: Never Smoker     Smokeless tobacco: Never Used     Alcohol use Yes      Comment: occasionally       If you drink alcohol do you typically have >3 drinks per day or >7 drinks per week? {ETOH :481365}                        Today's PHQ-2 Score:   PHQ-2 (  Pfizer) 2018   Q1: Little interest or pleasure in doing things 0 0   Q2: Feeling down, depressed or " "hopeless 0 0   PHQ-2 Score 0 0     {PHQ-2 LOOK IN ASSESSMENTS (Optional) :293547}  Do you feel safe in your environment - {YES/NO/NA:670555}    Do you have a Health Care Directive?: {HEALTHCARE DIRECTIVE STATUS:462890}    Current providers sharing in care for this patient include:   Patient Care Team:  Vilma Grady MD as PCP - General (Internal Medicine)    The following health maintenance items are reviewed in Epic and correct as of today:  Health Maintenance   Topic Date Due     HIV SCREEN (SYSTEM ASSIGNED)  1970     ADVANCE DIRECTIVE PLANNING Q5 YRS  2017     PNEUMOCOCCAL (1 of 2 - PCV13) 2017     FALL RISK ASSESSMENT  2019     MAMMO Q2 YR  2019     PAP Q3 YR  2019     TETANUS IMMUNIZATION (SYSTEM ASSIGNED)  01/10/2021     LIPID SCREEN Q5 YR FEMALE (SYSTEM ASSIGNED)  2022     COLONOSCOPY Q5 YR  2023     MIGRAINE ACTION PLAN  Completed     DEXA SCAN SCREENING (SYSTEM ASSIGNED)  Completed     INFLUENZA VACCINE  Completed     HEPATITIS C SCREENING  Completed     {Chronicprobdata (Optional):438536}    {Decision Support (Optional):611735}    ROS:  {ROS COMP:298469}    OBJECTIVE:   LMP 2004 Estimated body mass index is 25.49 kg/(m^2) as calculated from the following:    Height as of 18: 5' 3\" (1.6 m).    Weight as of 18: 143 lb 14.4 oz (65.3 kg).  EXAM:   {Exam :896444}    ASSESSMENT / PLAN:   {Diag Picklist:982402}    End of Life Planning:  Patient currently has an advanced directive: { :647546}    COUNSELING:  {Medicare Counselin}    {BP Counseling- Complete if BP >= 120/80  (Optional):489941}    Estimated body mass index is 25.49 kg/(m^2) as calculated from the following:    Height as of 18: 5' 3\" (1.6 m).    Weight as of 18: 143 lb 14.4 oz (65.3 kg).  {Weight Management Plan -- Complete if patient has an abnormal BMI (Optional):605962}     reports that she has never smoked. She has never used smokeless tobacco.  {Tobacco Cessation -- " Complete if patient is a smoker (Optional):645409}    Appropriate preventive services were discussed with this patient, including applicable screening as appropriate for cardiovascular disease, diabetes, osteopenia/osteoporosis, and glaucoma.  As appropriate for age/gender, discussed screening for colorectal cancer, prostate cancer, breast cancer, and cervical cancer. Checklist reviewing preventive services available has been given to the patient.    Reviewed patients plan of care and provided an AVS. The {CarePlan:516349} for Vilma meets the Care Plan requirement. This Care Plan has been established and reviewed with the {PATIENT, FAMILY MEMBER, CAREGIVER:096409}.    Counseling Resources:  ATP IV Guidelines  Pooled Cohorts Equation Calculator  Breast Cancer Risk Calculator  FRAX Risk Assessment  ICSI Preventive Guidelines  Dietary Guidelines for Americans, 2010  USDA's MyPlate  ASA Prophylaxis  Lung CA Screening    Vilma Grady MD  [facility]

## 2018-06-07 NOTE — NURSING NOTE
Prior to injection verified patient identity using patient's name and date of birth.  Screening Questionnaire for Adult Immunization    Are you sick today?   No   Do you have allergies to medications, food, a vaccine component or latex?   No   Have you ever had a serious reaction after receiving a vaccination?   No   Do you have a long-term health problem with heart disease, lung disease, asthma, kidney disease, metabolic disease (e.g. diabetes), anemia, or other blood disorder?   No   Do you have cancer, leukemia, HIV/AIDS, or any other immune system problem?   No   In the past 3 months, have you taken medications that affect  your immune system, such as prednisone, other steroids, or anticancer drugs; drugs for the treatment of rheumatoid arthritis, Crohn s disease, or psoriasis; or have you had radiation treatments?   No   Have you had a seizure, or a brain or other nervous system problem?   No   During the past year, have you received a transfusion of blood or blood     products, or been given immune (gamma) globulin or antiviral drug?   No   For women: Are you pregnant or is there a chance you could become        pregnant during the next month?   No   Have you received any vaccinations in the past 4 weeks?   No     Immunization questionnaire answers were all negative.   MNVFC doesn't apply on this patient  Per orders of Dr. Grady, injection of Prevnar given by Shameka Allen. Patient instructed to remain in clinic for 20 minutes afterwards, and to report any adverse reaction to me immediately.     Screening performed by Shameka Allen on 5/24/2017 at 11:08 AM.    Patient instructed to remain in clinic for 20 minutes afterwards, and to report any adverse reaction to me immediately.

## 2018-06-28 ENCOUNTER — TRANSFERRED RECORDS (OUTPATIENT)
Dept: HEALTH INFORMATION MANAGEMENT | Facility: CLINIC | Age: 68
End: 2018-06-28

## 2018-07-25 ENCOUNTER — OFFICE VISIT (OUTPATIENT)
Dept: UROLOGY | Facility: CLINIC | Age: 68
End: 2018-07-25
Payer: COMMERCIAL

## 2018-07-25 VITALS — BODY MASS INDEX: 25.69 KG/M2 | HEART RATE: 78 BPM | HEIGHT: 63 IN | WEIGHT: 145 LBS | OXYGEN SATURATION: 98 %

## 2018-07-25 DIAGNOSIS — R39.11 URINARY HESITANCY: Primary | ICD-10-CM

## 2018-07-25 LAB
ALBUMIN UR-MCNC: NEGATIVE MG/DL
APPEARANCE UR: CLEAR
BILIRUB UR QL STRIP: NEGATIVE
COLOR UR AUTO: YELLOW
GLUCOSE UR STRIP-MCNC: NEGATIVE MG/DL
HGB UR QL STRIP: ABNORMAL
KETONES UR STRIP-MCNC: NEGATIVE MG/DL
LEUKOCYTE ESTERASE UR QL STRIP: ABNORMAL
NITRATE UR QL: NEGATIVE
PH UR STRIP: 6 PH (ref 5–7)
RESIDUAL VOLUME (RV) (EXTERNAL): 17
SOURCE: ABNORMAL
SP GR UR STRIP: 1.01 (ref 1–1.03)
UROBILINOGEN UR STRIP-ACNC: 0.2 EU/DL (ref 0.2–1)

## 2018-07-25 PROCEDURE — 81003 URINALYSIS AUTO W/O SCOPE: CPT | Mod: QW | Performed by: PHYSICIAN ASSISTANT

## 2018-07-25 PROCEDURE — 51798 US URINE CAPACITY MEASURE: CPT | Performed by: PHYSICIAN ASSISTANT

## 2018-07-25 PROCEDURE — 99203 OFFICE O/P NEW LOW 30 MIN: CPT | Mod: 25 | Performed by: PHYSICIAN ASSISTANT

## 2018-07-25 RX ORDER — ESTRADIOL 0.1 MG/G
CREAM VAGINAL
Qty: 42.5 G | Refills: 3 | Status: SHIPPED | OUTPATIENT
Start: 2018-07-25 | End: 2019-06-25

## 2018-07-25 ASSESSMENT — PAIN SCALES - GENERAL: PAINLEVEL: NO PAIN (0)

## 2018-07-25 NOTE — PROGRESS NOTES
"CC: Urinary urgency and frequency.    HPI: It is a pleasure to see Vilma Hughes, a 67 year old female asked to be seen in consultation by Dr. Grady for the above. This problem has been going on for 9 months. The patient voids q30 min during the day, nocturia x 0. There is urgency associated with symptoms of urge incontinence. The patient wears protective pads. No leakage with coughing, sneezing, bending at the waist.      Denies any dysuria, gross hematuria, pyuria, sensation of incomplete bladder emptying, needing to strain or Crede to void, history of recent urinary tract infections or kidney stones. The patient does not have any neurologic issues. Denies constipation. Fluid consumption includes \"a lot of water\", coffee and soda.    Past Medical History:   Diagnosis Date     Atypical nevus 8/16     Diffuse cystic mastopathy      Ebstein's anomaly      Palpitations      Spider veins      Thoracic or lumbosacral neuritis or radiculitis, unspecified     L leg neuropathy     Tubular adenoma 1/16     Past Surgical History:   Procedure Laterality Date     COLONOSCOPY N/A 1/29/2016    Procedure: COMBINED COLONOSCOPY, SINGLE OR MULTIPLE BIOPSY/POLYPECTOMY BY BIOPSY;  Surgeon: Margarette Millan MD;  Location:  GI     HC BIOPSY OF BREAST, OPEN INCISIONAL       HC REMOVAL OF TONSILS,<11 Y/O      Tonsils <14y.o.     PELVIS LAPAROSCOPY,DX       Current Outpatient Prescriptions   Medication Sig Dispense Refill     estradiol (ESTRACE VAGINAL) 0.1 MG/GM cream Apply small amount to the vaginal opening and urethra M, W, F q. h.s. 42.5 g 3     estradiol (ESTRACE) 0.1 MG/GM cream Place 2 g vaginally twice a week (Patient not taking: Reported on 6/7/2018) 42.5 g 3     SUMAtriptan (IMITREX) 50 MG tablet Take 1 tablet (50 mg) by mouth at onset of headache for migraine May repeat dose in 2 hours.  Do not exceed 200 mg in 24 hours (Patient not taking: Reported on 7/25/2018) 18 tablet 11     Allergies   Allergen Reactions     " "Fentanyl Nausea and Vomiting     Family History: There is no h/o  malignancy.  There is no h/o urolithiasis.    Social History: The patient does not smoke cigarettes. Denies EtOH and illicit drug use.      ROS: A comprehensive 14 point ROS was obtained and otherwise negative except for that outlined above in the HPI.    PHYSICAL EXAM:   Vitals:    07/25/18 1530   Pulse: 78   SpO2: 98%   Weight: 65.8 kg (145 lb)   Height: 1.6 m (5' 3\")     GENERAL: Well groomed, well developed, well nourished female in NAD.  HEENT: EOMI, AT, NC.  SKIN: Warm to touch, dry.  No visible rashes or lesions on examined areas.  RESP: No increased respiratory effort.   LYMPH: No LE edema.  ABD: Soft, NT, ND.  No CVAT bilaterally.  MS: Full ROM in extremities.  PELVIC:       Examined in dorsal lithotomy position with a speculum.      Normal external genitalia and introitus, atrophic changes.   NEURO: Alert and oriented x 3.  PSYCH: Normal mood and affect, pleasant and agreeable during interview and exam.    PVR: 17 mL.  U/A: trace blood    IMAGING: None      ASSESSMENT/PLAN:  Ms. Vilma Hughes is a 67 year old female with Urinary freq. We discussed potential management options including continued observation, behavioral modifications such as timed voiding, double voiding, pelvic floor physical therapy, medications (anticholinergics or Myrbetriq), intravesical Botox injections, PTNS, InterStim, or additional evaluation with cystoscopy and/or video urodynamics to further evaluate the anatomy and function of the lower urinary tract. The patient has elected to proceed with the following:  - Eliminate bladder irritants  - Estrace cream  - Reviewed behavioral therapies, such as timed voiding, pelvic floor relaxation while voiding and not voiding in a hurry.  Consider possible pelvic floor physical therapy and biofeedback in the future (discussed)  -Consider med management if the above is not helpful    Follow up in 1 month for reassessment.  "     Consider urodynamics and intravesical examination with cystoscopy in the future if the patient fails medication therapy when tried.      I have enjoyed participating in the medical care of this patient.  Please do not hesitate to contact me with any questions or concerns.     Avelina Duran PA-C  [facility]    30 min spent face to face with the patient, >50% of that time spent on counseling and coordination of care of urinary frequency/urgency.

## 2018-07-25 NOTE — NURSING NOTE
Pvr=17  Pt co freq.  Pt has slow flow.  Pt voids and if she sits on the toilet longer she is will go again.  Pt had a UTI this last winter.  RIKA Ryan CMA  Pt has estrace cream, but has never used.  RIKA Ryan CMA

## 2018-07-25 NOTE — MR AVS SNAPSHOT
After Visit Summary   7/25/2018    Vilma Hughes    MRN: [id number]           Patient Information     Date Of Birth          1950        Visit Information        Provider Department      7/25/2018 3:30 PM Avelina Duran PA-C [facility]        Today's Diagnoses     Urinary hesitancy    -  1      Care Instructions    Estrace cream, pea-sized amount to the opening of the vagina and urethra, Mon, Wed, Fri at bedtime.      Below is a list of things that can irritate the bladder and should be avoided:      Caffeinated soft drinks.    Coffee.    Tea.    Chocolate.    Tomato-based foods.    Acidic juices and fruits. (includes cranberry juice)    Alcohol.    Carbonated drinks.    Aspartame/Nutrasweet.               Follow-ups after your visit        Follow-up notes from your care team     Return in about 4 weeks (around 8/22/2018).      Your next 10 appointments already scheduled     Aug 29, 2018  1:00 PM CDT   Return Visit with Avelina Duran PA-C   [facility] ([facility])    [address]  [address]  [facility] [address] [phone]              Who to contact     If you have questions or need follow up information about today's clinic visit or your schedule please contact [facility] directly at [phone].  Normal or non-critical lab and imaging results will be communicated to you by MyChart, letter or phone within 4 business days after the clinic has received the results. If you do not hear from us within 7 days, please contact the clinic through MyChart or phone. If you have a critical or abnormal lab result, we will notify you by phone as soon as possible.  Submit refill requests through [facility] or call your pharmacy and they will forward the refill request to us. Please allow 3 business days for your refill to be  "completed.          Additional Information About Your Visit        [employer] Information     [employer] gives you secure access to your electronic health record. If you see a primary care provider, you can also send messages to your care team and make appointments. If you have questions, please call your primary care clinic.  If you do not have a primary care provider, please call [phone] and they will assist you.        Care EveryWhere ID     This is your Care EveryWhere ID. This could be used by other organizations to access your [place] medical records  [id number]        Your Vitals Were     Pulse Height Last Period Pulse Oximetry BMI (Body Mass Index)       78 1.6 m (5' 3\") 08/18/2004 98% 25.69 kg/m2        Blood Pressure from Last 3 Encounters:   06/07/18 98/62   02/02/18 126/74   08/21/17 98/62    Weight from Last 3 Encounters:   07/25/18 65.8 kg (145 lb)   06/07/18 66 kg (145 lb 8 oz)   02/02/18 65.3 kg (143 lb 14.4 oz)              We Performed the Following     Bladder scan     UA without Microscopic     Urine Micro Urologic Phys          Today's Medication Changes          These changes are accurate as of 7/25/18  4:07 PM.  If you have any questions, ask your nurse or doctor.               These medicines have changed or have updated prescriptions.        Dose/Directions    * estradiol 0.1 MG/GM cream   Commonly known as:  ESTRACE   This may have changed:  Another medication with the same name was added. Make sure you understand how and when to take each.   Used for:  Atrophic vaginitis   Changed by:  Avelina Duran PA-C        Dose:  2 g   Place 2 g vaginally twice a week   Quantity:  42.5 g   Refills:  3       * estradiol 0.1 MG/GM cream   Commonly known as:  ESTRACE VAGINAL   This may have changed:  You were already taking a medication with the same name, and this prescription was added. Make sure you understand how and when to take each.   Used for:  Urinary hesitancy   Changed by:  Renee" Avelina Osborne PA-C        Apply small amount to the vaginal opening and urethra M, W, F q. h.s.   Quantity:  42.5 g   Refills:  3       * Notice:  This list has 2 medication(s) that are the same as other medications prescribed for you. Read the directions carefully, and ask your doctor or other care provider to review them with you.         Where to get your medicines      Some of these will need a paper prescription and others can be bought over the counter.  Ask your nurse if you have questions.     Bring a paper prescription for each of these medications     estradiol 0.1 MG/GM cream                Primary Care Provider Office Phone # Fax #    Vilma Grady [phone]       [address]ika [address] [place] [id number]        Equal Access to Services     CHERELLE CROWELL : Hadii shahid hilliardo Sorosemary, waaxda luqadaha, qaybta kaalmada adeegyada, víctor armstrong . So [facility] [phone].    ATENCIÓN: Si habla español, tiene a waldron disposición servicios gratuitos de asistencia lingüística. Nevaeh al [phone].    We comply with applicable federal civil rights laws and [place] laws. We do not discriminate on the basis of race, color, national origin, age, disability, sex, sexual orientation, or gender identity.            Thank you!     Thank you for choosing [facility] [place]  for your care. Our goal is always to provide you with excellent care. Hearing back from our patients is one way we can continue to improve our services. Please take a few minutes to complete the written survey that you may receive in the mail after your visit with us. Thank you!             Your Updated Medication List - Protect others around you: Learn how to safely use, store and throw away your medicines at www.disposemymeds.org.          This list is accurate as of 7/25/18  4:07 PM.  Always use your most recent med list.                   Brand Name Dispense  Instructions for use Diagnosis    * estradiol 0.1 MG/GM cream    ESTRACE    42.5 g    Place 2 g vaginally twice a week    Atrophic vaginitis       * estradiol 0.1 MG/GM cream    ESTRACE VAGINAL    42.5 g    Apply small amount to the vaginal opening and urethra M, W, F q. h.s.    Urinary hesitancy       SUMAtriptan 50 MG tablet    IMITREX    18 tablet    Take 1 tablet (50 mg) by mouth at onset of headache for migraine May repeat dose in 2 hours.  Do not exceed 200 mg in 24 hours    Nonintractable migraine, unspecified migraine type       * Notice:  This list has 2 medication(s) that are the same as other medications prescribed for you. Read the directions carefully, and ask your doctor or other care provider to review them with you.

## 2018-07-25 NOTE — LETTER
"7/25/2018       RE: Vilma Hughes  [address]  [facility] [address]     Dear Colleague,    Thank you for referring your patient, Vilma Hughes, to the [facility] [place] [facility]. Please see a copy of my visit note below.    CC: Urinary urgency and frequency.    HPI: It is a pleasure to see Vilma Hughes, a 67 year old female asked to be seen in consultation by Dr. Grady for the above. This problem has been going on for 9 months. The patient voids q30 min during the day, nocturia x 0. There is urgency associated with symptoms of urge incontinence. The patient wears protective pads. No leakage with coughing, sneezing, bending at the waist.      Denies any dysuria, gross hematuria, pyuria, sensation of incomplete bladder emptying, needing to strain or Crede to void, history of recent urinary tract infections or kidney stones. The patient does not have any neurologic issues. Denies constipation. Fluid consumption includes \"a lot of water\", coffee and soda.    Past Medical History:   Diagnosis Date     Atypical nevus 8/16     Diffuse cystic mastopathy      Ebstein's anomaly      Palpitations      Spider veins      Thoracic or lumbosacral neuritis or radiculitis, unspecified     L leg neuropathy     Tubular adenoma 1/16     Past Surgical History:   Procedure Laterality Date     COLONOSCOPY N/A 1/29/2016    Procedure: COMBINED COLONOSCOPY, SINGLE OR MULTIPLE BIOPSY/POLYPECTOMY BY BIOPSY;  Surgeon: Margarette Millan MD;  Location:  GI     HC BIOPSY OF BREAST, OPEN INCISIONAL       HC REMOVAL OF TONSILS,<11 Y/O      Tonsils <14y.o.     PELVIS LAPAROSCOPY,DX       Current Outpatient Prescriptions   Medication Sig Dispense Refill     estradiol (ESTRACE VAGINAL) 0.1 MG/GM cream Apply small amount to the vaginal opening and urethra M, W, F q. h.s. 42.5 g 3     estradiol (ESTRACE) 0.1 MG/GM cream Place 2 g vaginally twice a week " "(Patient not taking: Reported on 6/7/2018) 42.5 g 3     SUMAtriptan (IMITREX) 50 MG tablet Take 1 tablet (50 mg) by mouth at onset of headache for migraine May repeat dose in 2 hours.  Do not exceed 200 mg in 24 hours (Patient not taking: Reported on 7/25/2018) 18 tablet 11     Allergies   Allergen Reactions     Fentanyl Nausea and Vomiting     Family History: There is no h/o  malignancy.  There is no h/o urolithiasis.    Social History: The patient does not smoke cigarettes. Denies EtOH and illicit drug use.      ROS: A comprehensive 14 point ROS was obtained and otherwise negative except for that outlined above in the HPI.    PHYSICAL EXAM:   Vitals:    07/25/18 1530   Pulse: 78   SpO2: 98%   Weight: 65.8 kg (145 lb)   Height: 1.6 m (5' 3\")     GENERAL: Well groomed, well developed, well nourished female in NAD.  HEENT: EOMI, AT, NC.  SKIN: Warm to touch, dry.  No visible rashes or lesions on examined areas.  RESP: No increased respiratory effort.   LYMPH: No LE edema.  ABD: Soft, NT, ND.  No CVAT bilaterally.  MS: Full ROM in extremities.  PELVIC:       Examined in dorsal lithotomy position with a speculum.      Normal external genitalia and introitus, atrophic changes.   NEURO: Alert and oriented x 3.  PSYCH: Normal mood and affect, pleasant and agreeable during interview and exam.    PVR: 17 mL.  U/A: trace blood    IMAGING: None      ASSESSMENT/PLAN:  Ms. Vilma Hughes is a 67 year old female with Urinary freq. We discussed potential management options including continued observation, behavioral modifications such as timed voiding, double voiding, pelvic floor physical therapy, medications (anticholinergics or Myrbetriq), intravesical Botox injections, PTNS, InterStim, or additional evaluation with cystoscopy and/or video urodynamics to further evaluate the anatomy and function of the lower urinary tract. The patient has elected to proceed with the following:  - Eliminate bladder irritants  - Estrace " cream  - Reviewed behavioral therapies, such as timed voiding, pelvic floor relaxation while voiding and not voiding in a hurry.  Consider possible pelvic floor physical therapy and biofeedback in the future (discussed)  -Consider med management if the above is not helpful    Follow up in 1 month for reassessment.      Consider urodynamics and intravesical examination with cystoscopy in the future if the patient fails medication therapy when tried.      I have enjoyed participating in the medical care of this patient.  Please do not hesitate to contact me with any questions or concerns.       30 min spent face to face with the patient, >50% of that time spent on counseling and coordination of care of urinary frequency/urgency.     Again, thank you for allowing me to participate in the care of your patient.      Sincerely,    Avelina Duran PA-C, RICKY

## 2018-07-25 NOTE — PATIENT INSTRUCTIONS
Estrace cream, pea-sized amount to the opening of the vagina and urethra, Mon, Wed, Fri at bedtime.      Below is a list of things that can irritate the bladder and should be avoided:      Caffeinated soft drinks.    Coffee.    Tea.    Chocolate.    Tomato-based foods.    Acidic juices and fruits. (includes cranberry juice)    Alcohol.    Carbonated drinks.    Aspartame/Nutrasweet.

## 2018-07-26 ENCOUNTER — TRANSFERRED RECORDS (OUTPATIENT)
Dept: HEALTH INFORMATION MANAGEMENT | Facility: CLINIC | Age: 68
End: 2018-07-26

## 2018-08-06 ENCOUNTER — TELEPHONE (OUTPATIENT)
Dept: INTERNAL MEDICINE | Facility: CLINIC | Age: 68
End: 2018-08-06

## 2018-08-06 DIAGNOSIS — H91.93 BILATERAL HEARING LOSS, UNSPECIFIED HEARING LOSS TYPE: Primary | ICD-10-CM

## 2018-08-06 NOTE — TELEPHONE ENCOUNTER
Reason for Call:  Other referral    Detailed comments: Pt [facility] a [facility].  Pt says ENT says they don't do hearing tests w/o a referral.  Pt is in a hurry to get in as her dual ins ends the end of Aug. Pls fax referral to [facility].    Phone Number Patient can be reached at: Home number on file [phone]    Best Time: any    Can we leave a detailed message on this number? YES-pls call when you send referral.    Call taken on 8/6/2018 at 9:21 AM by Verona Mora

## 2018-08-07 NOTE — TELEPHONE ENCOUNTER
Call to pt to get more information.     She states she has some hearing loss, especially with understanding what is said. The Left ear is worse.     She needs referral to Audiology. Any ENT clinic is OK.     Her Dual Insurance ends at the end of the month.

## 2018-08-16 ENCOUNTER — TRANSFERRED RECORDS (OUTPATIENT)
Dept: HEALTH INFORMATION MANAGEMENT | Facility: CLINIC | Age: 68
End: 2018-08-16

## 2018-08-29 ENCOUNTER — OFFICE VISIT (OUTPATIENT)
Dept: UROLOGY | Facility: CLINIC | Age: 68
End: 2018-08-29
Payer: COMMERCIAL

## 2018-08-29 VITALS — OXYGEN SATURATION: 98 % | WEIGHT: 145 LBS | BODY MASS INDEX: 25.69 KG/M2 | HEIGHT: 63 IN | HEART RATE: 76 BPM

## 2018-08-29 DIAGNOSIS — R35.0 URINARY FREQUENCY: Primary | ICD-10-CM

## 2018-08-29 PROCEDURE — 99213 OFFICE O/P EST LOW 20 MIN: CPT | Performed by: PHYSICIAN ASSISTANT

## 2018-08-29 RX ORDER — ESTRADIOL 0.1 MG/G
CREAM VAGINAL
Qty: 42.5 G | Refills: 11 | Status: SHIPPED | OUTPATIENT
Start: 2018-08-29 | End: 2019-06-25

## 2018-08-29 ASSESSMENT — PAIN SCALES - GENERAL: PAINLEVEL: NO PAIN (0)

## 2018-08-29 NOTE — MR AVS SNAPSHOT
After Visit Summary   8/29/2018    Vilma Hughes    MRN: [id number]           Patient Information     Date Of Birth          1950        Visit Information        Provider Department      8/29/2018 1:00 PM Avelina Duran PA-C [facility] Urology [facility] [place]        Today's Diagnoses     Urinary frequency    -  1      Care Instructions    Come find me if I can help more!    Avelina Duran PA-C  [facility] Urology  [phone]      Overactive bladder medications:    -Detrol LA (usually my first choice)  -Vesicare  -Sanctura  *Myrbetriq             Follow-ups after your visit        Who to contact     If you have questions or need follow up information about today's clinic visit or your schedule please contact [facility] UROLOGY [facility] directly at [phone].  Normal or non-critical lab and imaging results will be communicated to you by Optimal+hart, letter or phone within 4 business days after the clinic has received the results. If you do not hear from us within 7 days, please contact the clinic through Optimal+hart or phone. If you have a critical or abnormal lab result, we will notify you by phone as soon as possible.  Submit refill requests through [employer] or call your pharmacy and they will forward the refill request to us. Please allow 3 business days for your refill to be completed.          Additional Information About Your Visit        MyChart Information     [employer] gives you secure access to your electronic health record. If you see a primary care provider, you can also send messages to your care team and make appointments. If you have questions, please call your primary care clinic.  If you do not have a primary care provider, please call [phone] and they will assist you.        Care EveryWhere ID     This is your Care EveryWhere ID. This could be used by other organizations to access your [place] medical records  [id number]       "  Your Vitals Were     Pulse Height Last Period Pulse Oximetry BMI (Body Mass Index)       76 1.6 m (5' 3\") 08/18/2004 98% 25.69 kg/m2        Blood Pressure from Last 3 Encounters:   06/07/18 98/62   02/02/18 126/74   08/21/17 98/62    Weight from Last 3 Encounters:   08/29/18 65.8 kg (145 lb)   07/25/18 65.8 kg (145 lb)   06/07/18 66 kg (145 lb 8 oz)              Today, you had the following     No orders found for display         Today's Medication Changes          These changes are accurate as of 8/29/18  1:30 PM.  If you have any questions, ask your nurse or doctor.               These medicines have changed or have updated prescriptions.        Dose/Directions    * estradiol 0.1 MG/GM cream   Commonly known as:  ESTRACE   This may have changed:  Another medication with the same name was added. Make sure you understand how and when to take each.   Used for:  Atrophic vaginitis   Changed by:  Avleina Duran PA-C        Dose:  2 g   Place 2 g vaginally twice a week   Quantity:  42.5 g   Refills:  3       * estradiol 0.1 MG/GM cream   Commonly known as:  ESTRACE VAGINAL   This may have changed:  Another medication with the same name was added. Make sure you understand how and when to take each.   Used for:  Urinary hesitancy   Changed by:  Avelina Duran PA-C        Apply small amount to the vaginal opening and urethra M, W, F q. h.s.   Quantity:  42.5 g   Refills:  3       * estradiol 0.1 MG/GM cream   Commonly known as:  ESTRACE VAGINAL   This may have changed:  You were already taking a medication with the same name, and this prescription was added. Make sure you understand how and when to take each.   Used for:  Urinary frequency   Changed by:  Avelina Duran PA-C        Apply small amount to the vaginal opening and urethra M, W, F q. h.s.   Quantity:  42.5 g   Refills:  11       * Notice:  This list has 3 medication(s) that are the same as other medications prescribed for you. Read the directions " carefully, and ask your doctor or other care provider to review them with you.         Where to get your medicines      Some of these will need a paper prescription and others can be bought over the counter.  Ask your nurse if you have questions.     Bring a paper prescription for each of these medications     estradiol 0.1 MG/GM cream                Primary Care Provider Office Phone # Fax #    Vilma Grady [phone]       [address]  [facility] [address]        Equal Access to Services     CHERELLE CROWELL : Hadii aad ku hadasho Soomaali, waaxda luqadaha, qaybta kaalmada adeegyada, waxay idiin hayaan adeeg kharash la'ricki . So [facility] [phone].    ATENCIÓN: Si habla español, tiene a waldron disposición servicios gratuitos de asistencia lingüística. [facility] [phone].    We comply with applicable federal civil rights laws and [place] laws. We do not discriminate on the basis of race, color, national origin, age, disability, sex, sexual orientation, or gender identity.            Thank you!     Thank you for choosing [facility] [place]  for your care. Our goal is always to provide you with excellent care. Hearing back from our patients is one way we can continue to improve our services. Please take a few minutes to complete the written survey that you may receive in the mail after your visit with us. Thank you!             Your Updated Medication List - Protect others around you: Learn how to safely use, store and throw away your medicines at www.disposemymeds.org.          This list is accurate as of 8/29/18  1:30 PM.  Always use your most recent med list.                   Brand Name Dispense Instructions for use Diagnosis    * estradiol 0.1 MG/GM cream    ESTRACE    42.5 g    Place 2 g vaginally twice a week    Atrophic vaginitis       * estradiol 0.1 MG/GM cream    ESTRACE VAGINAL    42.5 g    Apply small amount to the vaginal opening and urethra  M, W, F q. h.s.    Urinary hesitancy       * estradiol 0.1 MG/GM cream    ESTRACE VAGINAL    42.5 g    Apply small amount to the vaginal opening and urethra M, W, F q. h.s.    Urinary frequency       SUMAtriptan 50 MG tablet    IMITREX    18 tablet    Take 1 tablet (50 mg) by mouth at onset of headache for migraine May repeat dose in 2 hours.  Do not exceed 200 mg in 24 hours    Nonintractable migraine, unspecified migraine type       * Notice:  This list has 3 medication(s) that are the same as other medications prescribed for you. Read the directions carefully, and ask your doctor or other care provider to review them with you.

## 2018-08-29 NOTE — NURSING NOTE
Pt thinks she is better.  Pt wants a paper copy for estrace cream.  Pt still has some urgency.  Urgency usually right after work.  RIKA Ryan, CMA

## 2018-08-29 NOTE — PROGRESS NOTES
"CC: Urinary urgency and frequency.    HPI: It is a pleasure to see Vilma Hughes, a pleasant 67 year old female seen in  for the above. This problem has been going on for 10 months. The patient voids q60 (improved) min during the day, nocturia x 0. There is mild urgency associated with symptoms of urge incontinence. The patient wears protective pads. No leakage with coughing, sneezing, bending at the waist.  Started on estrace cream three times a week last month and seeing improvement in her symptoms.     Denies any dysuria, gross hematuria, pyuria, sensation of incomplete bladder emptying, needing to strain or Crede to void, history of recent urinary tract infections or kidney stones. The patient does not have any neurologic issues. Denies constipation. Fluid consumption includes \"a lot of water\", coffee and soda.    Past Medical History:   Diagnosis Date     Atypical nevus 8/16     Diffuse cystic mastopathy      Ebstein's anomaly      Palpitations      Spider veins      Thoracic or lumbosacral neuritis or radiculitis, unspecified     L leg neuropathy     Tubular adenoma 1/16     Past Surgical History:   Procedure Laterality Date     COLONOSCOPY N/A 1/29/2016    Procedure: COMBINED COLONOSCOPY, SINGLE OR MULTIPLE BIOPSY/POLYPECTOMY BY BIOPSY;  Surgeon: Margarette Millan MD;  Location: [facility]     HC BIOPSY OF BREAST, OPEN INCISIONAL       HC REMOVAL OF TONSILS,<11 Y/O      Tonsils <14y.o.     PELVIS LAPAROSCOPY,DX       Current Outpatient Prescriptions   Medication Sig Dispense Refill     estradiol (ESTRACE VAGINAL) 0.1 MG/GM cream Apply small amount to the vaginal opening and urethra M, W, F q. h.s. 42.5 g 3     estradiol (ESTRACE) 0.1 MG/GM cream Place 2 g vaginally twice a week (Patient not taking: Reported on 6/7/2018) 42.5 g 3     SUMAtriptan (IMITREX) 50 MG tablet Take 1 tablet (50 mg) by mouth at onset of headache for migraine May repeat dose in 2 hours.  Do not exceed 200 mg in 24 hours (Patient not " "taking: Reported on 7/25/2018) 18 tablet 11     Allergies   Allergen Reactions     Fentanyl Nausea and Vomiting     Family History: There is no h/o  malignancy.  There is no h/o urolithiasis.    Social History: The patient does not smoke cigarettes. Denies EtOH and illicit drug use.      ROS: A comprehensive 14 point ROS was obtained and otherwise negative except for that outlined above in the HPI.    PHYSICAL EXAM:   Vitals:    08/29/18 1301   Pulse: 76   SpO2: 98%   Weight: 65.8 kg (145 lb)   Height: 1.6 m (5' 3\")     GENERAL: Well groomed, well developed, well nourished female in NAD.  HEENT: EOMI, AT, NC.  SKIN: Warm to touch, dry.  RESP: No increased respiratory effort.   LYMPH: No LE edema.  ABD: Soft, NT, ND.  No CVAT bilaterally.  MS: Full ROM in extremities.  PELVIC:  (last visit)      Examined in dorsal lithotomy position with a speculum.      Normal external genitalia and introitus, atrophic changes.   NEURO: Alert and oriented x 3.  PSYCH: Normal mood and affect, pleasant and agreeable during interview and exam.    IMAGING: None    ASSESSMENT/PLAN:  Ms. Vilma Hughes is a 67 year old female with Urinary freq, improved.  - Eliminate bladder irritants  - Continue Estrace cream  - Reviewed behavioral therapies, such as timed voiding, pelvic floor relaxation while voiding and not voiding in a hurry.  Consider possible pelvic floor physical therapy and biofeedback in the future (discussed)  -Consider med management if the above is not helpful (gave her a list in the chance she is interested).     Follow up prn.      RICKY Campos [facility] Urology    20 min spent face to face with the patient, >50% of that time spent on counseling and coordination of care of urinary frequency/urgency.       "

## 2018-08-29 NOTE — PATIENT INSTRUCTIONS
Come find me if I can help more!    Avelina Duran PA-C  [facility] Urology  [phone]      Overactive bladder medications:    -Detrol LA (usually my first choice)  -Vesicare  -Sanctura  *Myrbetriq

## 2019-05-14 ENCOUNTER — E-VISIT (OUTPATIENT)
Dept: INTERNAL MEDICINE | Facility: CLINIC | Age: 69
End: 2019-05-14
Payer: COMMERCIAL

## 2019-05-14 DIAGNOSIS — B37.31 YEAST INFECTION OF THE VAGINA: Primary | ICD-10-CM

## 2019-05-14 PROCEDURE — 99444 ZZC PHYSICIAN ONLINE EVALUATION & MANAGEMENT SERVICE: CPT | Performed by: INTERNAL MEDICINE

## 2019-05-14 RX ORDER — FLUCONAZOLE 150 MG/1
150 TABLET ORAL ONCE
Qty: 1 TABLET | Refills: 2 | Status: SHIPPED | OUTPATIENT
Start: 2019-05-14 | End: 2019-06-25

## 2019-06-22 ENCOUNTER — OFFICE VISIT (OUTPATIENT)
Dept: URGENT CARE | Facility: URGENT CARE | Age: 69
End: 2019-06-22
Payer: COMMERCIAL

## 2019-06-22 VITALS
TEMPERATURE: 97.9 F | OXYGEN SATURATION: 99 % | SYSTOLIC BLOOD PRESSURE: 116 MMHG | HEART RATE: 75 BPM | DIASTOLIC BLOOD PRESSURE: 70 MMHG

## 2019-06-22 DIAGNOSIS — N39.0 URINARY TRACT INFECTION WITH HEMATURIA, SITE UNSPECIFIED: Primary | ICD-10-CM

## 2019-06-22 DIAGNOSIS — R31.9 URINARY TRACT INFECTION WITH HEMATURIA, SITE UNSPECIFIED: Primary | ICD-10-CM

## 2019-06-22 DIAGNOSIS — N89.8 VAGINAL ITCHING: ICD-10-CM

## 2019-06-22 DIAGNOSIS — R30.0 DYSURIA: ICD-10-CM

## 2019-06-22 LAB
ALBUMIN UR-MCNC: NEGATIVE MG/DL
APPEARANCE UR: CLEAR
BACTERIA #/AREA URNS HPF: ABNORMAL /HPF
BILIRUB UR QL STRIP: NEGATIVE
COLOR UR AUTO: YELLOW
GLUCOSE UR STRIP-MCNC: NEGATIVE MG/DL
HGB UR QL STRIP: ABNORMAL
KETONES UR STRIP-MCNC: NEGATIVE MG/DL
LEUKOCYTE ESTERASE UR QL STRIP: ABNORMAL
NITRATE UR QL: NEGATIVE
NON-SQ EPI CELLS #/AREA URNS LPF: ABNORMAL /LPF
PH UR STRIP: 5 PH (ref 5–7)
RBC #/AREA URNS AUTO: ABNORMAL /HPF
SOURCE: ABNORMAL
SP GR UR STRIP: <=1.005 (ref 1–1.03)
SPECIMEN SOURCE: NORMAL
UROBILINOGEN UR STRIP-ACNC: 0.2 EU/DL (ref 0.2–1)
WBC #/AREA URNS AUTO: ABNORMAL /HPF
WET PREP SPEC: NORMAL

## 2019-06-22 PROCEDURE — 87088 URINE BACTERIA CULTURE: CPT | Performed by: PHYSICIAN ASSISTANT

## 2019-06-22 PROCEDURE — 87186 SC STD MICRODIL/AGAR DIL: CPT | Performed by: PHYSICIAN ASSISTANT

## 2019-06-22 PROCEDURE — 87210 SMEAR WET MOUNT SALINE/INK: CPT | Performed by: PHYSICIAN ASSISTANT

## 2019-06-22 PROCEDURE — 81001 URINALYSIS AUTO W/SCOPE: CPT | Performed by: PHYSICIAN ASSISTANT

## 2019-06-22 PROCEDURE — 87086 URINE CULTURE/COLONY COUNT: CPT | Performed by: PHYSICIAN ASSISTANT

## 2019-06-22 PROCEDURE — 99214 OFFICE O/P EST MOD 30 MIN: CPT | Performed by: PHYSICIAN ASSISTANT

## 2019-06-22 RX ORDER — SULFAMETHOXAZOLE/TRIMETHOPRIM 800-160 MG
1 TABLET ORAL 2 TIMES DAILY
Qty: 10 TABLET | Refills: 0 | Status: SHIPPED | OUTPATIENT
Start: 2019-06-22 | End: 2019-06-25

## 2019-06-22 RX ORDER — FLUCONAZOLE 150 MG/1
150 TABLET ORAL
Qty: 4 TABLET | Refills: 0 | Status: SHIPPED | OUTPATIENT
Start: 2019-06-22 | End: 2019-06-25

## 2019-06-22 RX ORDER — PHENAZOPYRIDINE HYDROCHLORIDE 200 MG/1
200 TABLET, FILM COATED ORAL 3 TIMES DAILY PRN
Qty: 6 TABLET | Refills: 0 | Status: SHIPPED | OUTPATIENT
Start: 2019-06-22 | End: 2019-06-25

## 2019-06-22 NOTE — PROGRESS NOTES
SUBJECTIVE:   Vilma Hughes is a 68 year old female who  presents today for a possible UTI. Symptoms of dysuria, frequency and burning have been going on for 1day(s).  Hematuria yes pink and few clots.  sudden onsetand mild and moderate.  There is no history of fever, chills, nausea or vomiting.  Does have some vaginal itching also  This patient does  have a history of urinary tract infections. Patient denies long duration, rigors, flank pain, temperature > 101 degrees F., Vomiting, significant nausea  or diarrhea, taking Coumadin and GFR less than 30 within the last year     Past Medical History:   Diagnosis Date     Atypical nevus 8/16     Diffuse cystic mastopathy      Ebstein's anomaly      Palpitations      Spider veins      Thoracic or lumbosacral neuritis or radiculitis, unspecified     L leg neuropathy     Tubular adenoma 1/16     Current Outpatient Medications   Medication Sig Dispense Refill     estradiol (ESTRACE VAGINAL) 0.1 MG/GM cream Apply small amount to the vaginal opening and urethra M, W, F q. h.s. (Patient not taking: Reported on 6/22/2019) 42.5 g 11     estradiol (ESTRACE VAGINAL) 0.1 MG/GM cream Apply small amount to the vaginal opening and urethra M, W, F q. h.s. (Patient not taking: Reported on 6/22/2019) 42.5 g 3     estradiol (ESTRACE) 0.1 MG/GM cream Place 2 g vaginally twice a week (Patient not taking: Reported on 6/7/2018) 42.5 g 3     SUMAtriptan (IMITREX) 50 MG tablet Take 1 tablet (50 mg) by mouth at onset of headache for migraine May repeat dose in 2 hours.  Do not exceed 200 mg in 24 hours (Patient not taking: Reported on 7/25/2018) 18 tablet 11     Social History     Tobacco Use     Smoking status: Never Smoker     Smokeless tobacco: Never Used   Substance Use Topics     Alcohol use: Yes     Comment: occasionally       ROS:   Review of systems negative except as stated above.    OBJECTIVE:  /70 (BP Location: Right arm, Patient Position: Chair, Cuff Size: Adult Regular)    Pulse 75   Temp 97.9  F (36.6  C) (Oral)   LMP 08/18/2004   SpO2 99%   GENERAL APPEARANCE: healthy, alert and no distress  RESP: lungs clear to auscultation - no rales, rhonchi or wheezes  CV: regular rates and rhythm, normal S1 S2, no murmur noted  ABDOMEN:  soft, nontender, no HSM or masses and bowel sounds normal  BACK: No CVA tenderness  SKIN: no suspicious lesions or rashes    Results for orders placed or performed in visit on 06/22/19   UA reflex to Microscopic and Culture   Result Value Ref Range    Color Urine Yellow     Appearance Urine Clear     Glucose Urine Negative NEG^Negative mg/dL    Bilirubin Urine Negative NEG^Negative    Ketones Urine Negative NEG^Negative mg/dL    Specific Gravity Urine <=1.005 1.003 - 1.035    Blood Urine Large (A) NEG^Negative    pH Urine 5.0 5.0 - 7.0 pH    Protein Albumin Urine Negative NEG^Negative mg/dL    Urobilinogen Urine 0.2 0.2 - 1.0 EU/dL    Nitrite Urine Negative NEG^Negative    Leukocyte Esterase Urine Small (A) NEG^Negative    Source Midstream Urine    Urine Microscopic   Result Value Ref Range    WBC Urine 25-50 (A) OTO5^0 - 5 /HPF    RBC Urine  (A) OTO2^O - 2 /HPF    Squamous Epithelial /LPF Urine Few FEW^Few /LPF    Bacteria Urine Few (A) NEG^Negative /HPF   Wet prep   Result Value Ref Range    Specimen Description Vagina     Wet Prep No Trichomonas seen     Wet Prep No yeast seen     Wet Prep No clue cells seen     Wet Prep Moderate  WBC'S seen            ASSESSMENT:   Lower, uncomplicated urinary tract infection.    PLAN:  Bactrim as directed and culture pending,  Diflucan if needed fork yeast if develops  Drink plenty of fluids.  Prevention and treatment of UTI's discussed.Signs and symptoms of pyelonephritis mentioned.  Follow up with primary care physician if not improving

## 2019-06-24 LAB
BACTERIA SPEC CULT: ABNORMAL
SPECIMEN SOURCE: ABNORMAL

## 2019-06-25 ENCOUNTER — OFFICE VISIT (OUTPATIENT)
Dept: INTERNAL MEDICINE | Facility: CLINIC | Age: 69
End: 2019-06-25
Payer: COMMERCIAL

## 2019-06-25 ENCOUNTER — TELEPHONE (OUTPATIENT)
Dept: INTERNAL MEDICINE | Facility: CLINIC | Age: 69
End: 2019-06-25

## 2019-06-25 VITALS
RESPIRATION RATE: 16 BRPM | SYSTOLIC BLOOD PRESSURE: 118 MMHG | OXYGEN SATURATION: 98 % | HEART RATE: 77 BPM | BODY MASS INDEX: 24.36 KG/M2 | HEIGHT: 63 IN | TEMPERATURE: 97.8 F | DIASTOLIC BLOOD PRESSURE: 72 MMHG | WEIGHT: 137.5 LBS

## 2019-06-25 DIAGNOSIS — Z13.1 SCREENING FOR DIABETES MELLITUS: ICD-10-CM

## 2019-06-25 DIAGNOSIS — Z00.00 ENCOUNTER FOR ROUTINE ADULT HEALTH EXAMINATION WITHOUT ABNORMAL FINDINGS: Primary | ICD-10-CM

## 2019-06-25 DIAGNOSIS — Z83.49 FAMILY HISTORY OF GRAVES' DISEASE: ICD-10-CM

## 2019-06-25 DIAGNOSIS — N30.01 ACUTE CYSTITIS WITH HEMATURIA: ICD-10-CM

## 2019-06-25 DIAGNOSIS — Z13.6 CARDIOVASCULAR SCREENING; LDL GOAL LESS THAN 130: ICD-10-CM

## 2019-06-25 DIAGNOSIS — B37.31 YEAST INFECTION OF THE VAGINA: ICD-10-CM

## 2019-06-25 DIAGNOSIS — Z23 PNEUMOCOCCAL VACCINATION ADMINISTERED AT CURRENT VISIT: ICD-10-CM

## 2019-06-25 PROCEDURE — 99213 OFFICE O/P EST LOW 20 MIN: CPT | Mod: 25 | Performed by: INTERNAL MEDICINE

## 2019-06-25 PROCEDURE — 90732 PPSV23 VACC 2 YRS+ SUBQ/IM: CPT | Performed by: INTERNAL MEDICINE

## 2019-06-25 PROCEDURE — 99397 PER PM REEVAL EST PAT 65+ YR: CPT | Performed by: INTERNAL MEDICINE

## 2019-06-25 PROCEDURE — G0439 PPPS, SUBSEQ VISIT: HCPCS | Performed by: INTERNAL MEDICINE

## 2019-06-25 ASSESSMENT — ENCOUNTER SYMPTOMS
DYSURIA: 1
HEMATURIA: 1
FREQUENCY: 1

## 2019-06-25 ASSESSMENT — MIFFLIN-ST. JEOR: SCORE: 1132.83

## 2019-06-25 ASSESSMENT — ACTIVITIES OF DAILY LIVING (ADL): CURRENT_FUNCTION: NO ASSISTANCE NEEDED

## 2019-06-25 NOTE — PROGRESS NOTES
"SUBJECTIVE:   Vilma Hughes is a 68 year old female who presents for Preventive Visit.    Are you in the first 12 months of your Medicare coverage?  No    Healthy Habits:     In general, how would you rate your overall health?  Good    Frequency of exercise:  4-5 days/week    Duration of exercise:  30-45 minutes    Do you usually eat at least 4 servings of fruit and vegetables a day, include whole grains    & fiber and avoid regularly eating high fat or \"junk\" foods?  No    Taking medications regularly:  Yes    Ability to successfully perform activities of daily living:  No assistance needed    Home Safety:  No safety concerns identified    Hearing Impairment:  Feel that people are mumbling or not speaking clearly    In the past 6 months, have you been bothered by leaking of urine?  No    In general, how would you rate your overall mental or emotional health?  Excellent      PHQ-2 Total Score: 0    Additional concerns today:  Yes    Do you feel safe in your environment? Yes  Do you have a Health Care Directive? Yes: Patient states has Advance Directive and will bring in a copy to clinic.  Fall risk  Fallen 2 or more times in the past year?: No  Any fall with injury in the past year?: No    Cognitive Screening   1) Repeat 3 items (Leader, Season, Table)    2) Clock draw: ABNORMAL 11:00  3) 3 item recall: Recalls 1 object   Results: ABNORMAL clock, 1-2 items recalled: PROBABLE COGNITIVE IMPAIRMENT, **INFORM PROVIDER**    With further discussion, she just is very anxious about her bladder issues and feels that is distracting her from testing.    Mini-CogTM Copyright ALICIA Reed. Licensed by the author for use in [facility]; reprinted with permission (tariq@.[facility]). All rights reserved.      Do you have sleep apnea, excessive snoring or daytime drowsiness?: no      Current concerns:   She had been to urgent care 3 days ago for hematuria with clots, dysuria.  She is diagnosed with a bladder infection.  She has " had improvement in those symptoms but now feels she has a yeast infection.  She is having some discharge and itching.  She was given some Diflucan but has not taken it yet.  The hematuria has resolved.  She has never had this before.  She is very worried that this could indicate something more serious than a bladder infection.    She reports her sister has Graves' disease and suggested she might need thyroid testing.  She has no symptoms of thyroid problems.    Reviewed and updated as needed this visit by clinical staff         Reviewed and updated as needed this visit by Provider        Social History     Tobacco Use     Smoking status: Never Smoker     Smokeless tobacco: Never Used   Substance Use Topics     Alcohol use: Yes     Comment: occasionally     If you drink alcohol do you typically have >3 drinks per day or >7 drinks per week? No    Alcohol Use 6/25/2019   Prescreen: >3 drinks/day or >7 drinks/week? No   Prescreen: >3 drinks/day or >7 drinks/week? -   No flowsheet data found.            Current providers sharing in care for this patient include:   Patient Care Team:  Vilma Grady MD as PCP - General (Internal Medicine)  iVlma Grady MD as Assigned PCP    The following health maintenance items are reviewed in Epic and correct as of today:  Health Maintenance   Topic Date Due     ADVANCE CARE PLANNING  08/14/2017     PHQ-2  01/01/2019     FALL RISK ASSESSMENT  06/07/2019     PNEUMOCOCCAL IMMUNIZATION 65+ LOW/MEDIUM RISK (2 of 2 - PPSV23) 06/07/2019     MEDICARE ANNUAL WELLNESS VISIT  06/07/2019     MAMMO SCREENING  06/28/2020     DTAP/TDAP/TD IMMUNIZATION (3 - Td) 01/10/2021     COLONOSCOPY  04/30/2023     LIPID  06/07/2023     DEXA  Completed     HEPATITIS C SCREENING  Completed     MIGRAINE ACTION PLAN  Completed     INFLUENZA VACCINE  Completed     ZOSTER IMMUNIZATION  Completed     IPV IMMUNIZATION  Aged Out     MENINGITIS IMMUNIZATION  Aged Out       Patient Active Problem List   Diagnosis      "Diffuse cystic mastopathy     Ebstein's anomaly     Advanced directives, counseling/discussion     Nonintractable migraine, unspecified migraine type     CARDIOVASCULAR SCREENING; LDL GOAL LESS THAN 130     Current Outpatient Medications   Medication Sig Dispense Refill     fluconazole (DIFLUCAN) 150 MG tablet Take 1 tablet (150 mg) by mouth every 3 days (Patient not taking: Reported on 6/25/2019) 4 tablet 0     sulfamethoxazole-trimethoprim (BACTRIM DS/SEPTRA DS) 800-160 MG tablet Take 1 tablet by mouth 2 times daily for 5 days (Patient not taking: Reported on 6/25/2019) 10 tablet 0     SUMAtriptan (IMITREX) 50 MG tablet Take 1 tablet (50 mg) by mouth at onset of headache for migraine May repeat dose in 2 hours.  Do not exceed 200 mg in 24 hours (Patient not taking: Reported on 7/25/2018) 18 tablet 11        ROS  CONSTITUTIONAL: NEGATIVE for fever, chills, change in weight  INTEGUMENTARY/SKIN: NEGATIVE for worrisome rashes, moles or lesions  EYES: NEGATIVE for vision changes or irritation  ENT/MOUTH: NEGATIVE for ear, mouth and throat problems  RESP: NEGATIVE for significant cough or SOB  BREAST: NEGATIVE for masses, tenderness or discharge  CV: NEGATIVE for chest pain, palpitations or peripheral edema  GI: NEGATIVE for nausea, abdominal pain, heartburn, or change in bowel habits  : as above  MUSCULOSKELETAL: NEGATIVE for significant arthralgias or myalgia  NEURO: NEGATIVE for weakness, dizziness or paresthesias  ENDOCRINE: NEGATIVE for temperature intolerance, skin/hair changes  HEME: NEGATIVE for bleeding problems  PSYCHIATRIC: NEGATIVE for changes in mood or affect    OBJECTIVE:   LMP 08/18/2004  Estimated body mass index is 25.69 kg/m  as calculated from the following:    Height as of 8/29/18: 1.6 m (5' 3\").    Weight as of 8/29/18: 65.8 kg (145 lb).  Physical Exam        Objective:  Patient alert, in no acute distress  /72   Pulse 77   Temp 97.8  F (36.6  C) (Oral)   Resp 16   Ht 1.6 m (5' 3\")   " Wt 62.4 kg (137 lb 8 oz)   LMP 08/18/2004   SpO2 98%   BMI 24.36 kg/m      HEENT: extraocular movements are intact, pupils equal and reactive to light and accommodation, TMs clear, oropharynx clear  NECK: Neck supple. No adenopathy. Thyroid symmetric, normal size,, Carotids without bruits.  PULMONARY: clear to auscultation  CARDIAC: regular rate and rhythm and no murmurs, clicks, or gallops  PULSES: 2/2 throughout  BACK: no spinal or CVAT  ABDOMINAL: Soft, nontender.  Normal bowel sounds.  No hepatosplenomegaly or abnormal masses  BREAST: No breast masses or tenderness, No axillary masses or tenderness and No galactorrhea  PELVIC: declined  REFLEXES: 2+ throughout  SKIN: unremarkable         ASSESSMENT / PLAN:   1. Encounter for routine adult health examination without abnormal findings  Up to date    2. Yeast infection of the vagina  Advised to go ahead with the Diflucan, could repeat in 3 days if her symptoms persist.    3. Acute cystitis with hematuria  Advised that her urine culture shows E. coli and it is sensitive to the antibiotic.  Recommend she finish out the course.  Reassured that significant hematuria is not that unusual, urine last year showed no blood, no other episodes of blood so would not need evaluation and when she notices ongoing hematuria.  Advised to call if she feels her symptoms are returning since she was only given a 5-day course    4. Screening for diabetes mellitus    - Basic metabolic panel  (Ca, Cl, CO2, Creat, Gluc, K, Na, BUN)    5. CARDIOVASCULAR SCREENING; LDL GOAL LESS THAN 130    - Lipid panel reflex to direct LDL Fasting    6. Family history of Graves' disease  No symptoms but will go ahead and test  - TSH with free T4 reflex    End of Life Planning:  Patient currently has an advanced directive: Yes.  Practitioner is supportive of decision.    COUNSELING:  Reviewed preventive health counseling, as reflected in patient instructions    Estimated body mass index is 25.69 kg/m  as  "calculated from the following:    Height as of 8/29/18: 1.6 m (5' 3\").    Weight as of 8/29/18: 65.8 kg (145 lb).         reports that she has never smoked. She has never used smokeless tobacco.      Appropriate preventive services were discussed with this patient, including applicable screening as appropriate for cardiovascular disease, diabetes, osteopenia/osteoporosis, and glaucoma.  As appropriate for age/gender, discussed screening for colorectal cancer, prostate cancer, breast cancer, and cervical cancer. Checklist reviewing preventive services available has been given to the patient.    Reviewed patients plan of care and provided an AVS. The Basic Care Plan (routine screening as documented in Health Maintenance) for Vilma meets the Care Plan requirement. This Care Plan has been established and reviewed with the Patient.    Counseling Resources:  ATP IV Guidelines  Pooled Cohorts Equation Calculator  Breast Cancer Risk Calculator  FRAX Risk Assessment  ICSI Preventive Guidelines  Dietary Guidelines for Americans, 2010  [employer]'s MyPlate  ASA Prophylaxis  Lung CA Screening    Vilma Grady MD  [facility]    Identified Health Risks:  "

## 2019-06-25 NOTE — NURSING NOTE
"Vital signs:  Temp: 97.8  F (36.6  C) Temp src: Oral BP: 118/72 Pulse: 77   Resp: 16 SpO2: 98 %     Height: 160 cm (5' 3\") Weight: 62.4 kg (137 lb 8 oz)  Estimated body mass index is 24.36 kg/m  as calculated from the following:    Height as of this encounter: 1.6 m (5' 3\").    Weight as of this encounter: 62.4 kg (137 lb 8 oz).          "

## 2019-06-25 NOTE — PATIENT INSTRUCTIONS
PREVENTIVE HEALTH RECOMMENDATIONS:     Vaccines: Get a flu shot each year. Get a tetanus shot every 10 years.     Exercise for at least 150 minutes a week (an average of 30 minutes a day, 5 days of the week). This will help you control your weight and prevent disease.    Limit alcohol to one drink per day.    No smoking.     Wear sunscreen to prevent skin cancer.     See your dentist twice a year for an exam and cleaning.    Try to get Calcium 1200 mg total per day. It is best to not take it all at once. Try to get Vitamin D at least 4728-2657 units (25-50 mcg) per day.    BMI or Body Mass Index is a way of indicating weight and health risk for cardiovascular diseases, high blood pressure, diabetes.   Definitions:    Underweight is less than 18.5 and will be associated with health risk.   Normal BMI is 18.5 to 25   Overweight is 25-29   Obesity is 30 or greater   Morbid Obesity is 40 or greater or 35 or greater with diabetes, prediabetes or abnormal blood sugar, high blood pressure or elevated cholesterol  Obesity and Morbid Obesity are associated with higher health risks. Lowering calories, exercising more may lower your BMI and even small decreases can have positive impact on lowering health risks.   Your Body mass index is 24.36 kg/m ..,

## 2019-06-25 NOTE — TELEPHONE ENCOUNTER
Spoke to patient- was able to fit pt into lab schedule for tomorrow at 8:30 after speaking to Keisha lab lead.

## 2019-06-26 DIAGNOSIS — Z00.00 ENCOUNTER FOR ROUTINE ADULT HEALTH EXAMINATION WITHOUT ABNORMAL FINDINGS: ICD-10-CM

## 2019-06-26 DIAGNOSIS — Z83.49 FAMILY HISTORY OF GRAVES' DISEASE: ICD-10-CM

## 2019-06-26 LAB
ANION GAP SERPL CALCULATED.3IONS-SCNC: 6 MMOL/L (ref 3–14)
BUN SERPL-MCNC: 20 MG/DL (ref 7–30)
CALCIUM SERPL-MCNC: 9 MG/DL (ref 8.5–10.1)
CHLORIDE SERPL-SCNC: 103 MMOL/L (ref 94–109)
CHOLEST SERPL-MCNC: 189 MG/DL
CO2 SERPL-SCNC: 25 MMOL/L (ref 20–32)
CREAT SERPL-MCNC: 1.03 MG/DL (ref 0.52–1.04)
GFR SERPL CREATININE-BSD FRML MDRD: 56 ML/MIN/{1.73_M2}
GLUCOSE SERPL-MCNC: 99 MG/DL (ref 70–99)
HDLC SERPL-MCNC: 68 MG/DL
LDLC SERPL CALC-MCNC: 110 MG/DL
NONHDLC SERPL-MCNC: 121 MG/DL
POTASSIUM SERPL-SCNC: 4.4 MMOL/L (ref 3.4–5.3)
SODIUM SERPL-SCNC: 134 MMOL/L (ref 133–144)
TRIGL SERPL-MCNC: 57 MG/DL
TSH SERPL DL<=0.005 MIU/L-ACNC: 2.05 MU/L (ref 0.4–4)

## 2019-06-26 PROCEDURE — 80061 LIPID PANEL: CPT | Performed by: INTERNAL MEDICINE

## 2019-06-26 PROCEDURE — 80048 BASIC METABOLIC PNL TOTAL CA: CPT | Performed by: INTERNAL MEDICINE

## 2019-06-26 PROCEDURE — 84443 ASSAY THYROID STIM HORMONE: CPT | Performed by: INTERNAL MEDICINE

## 2019-06-26 PROCEDURE — 36415 COLL VENOUS BLD VENIPUNCTURE: CPT | Performed by: INTERNAL MEDICINE

## 2019-07-24 ENCOUNTER — TRANSFERRED RECORDS (OUTPATIENT)
Dept: HEALTH INFORMATION MANAGEMENT | Facility: CLINIC | Age: 69
End: 2019-07-24

## 2019-08-17 ENCOUNTER — MYC MEDICAL ADVICE (OUTPATIENT)
Dept: INTERNAL MEDICINE | Facility: CLINIC | Age: 69
End: 2019-08-17

## 2019-08-19 ENCOUNTER — E-VISIT (OUTPATIENT)
Dept: INTERNAL MEDICINE | Facility: CLINIC | Age: 69
End: 2019-08-19

## 2019-08-19 DIAGNOSIS — B37.31 YEAST INFECTION OF THE VAGINA: ICD-10-CM

## 2019-08-19 DIAGNOSIS — R30.0 DYSURIA: ICD-10-CM

## 2019-08-19 DIAGNOSIS — R30.0 DYSURIA: Primary | ICD-10-CM

## 2019-08-19 LAB
ALBUMIN UR-MCNC: NEGATIVE MG/DL
APPEARANCE UR: CLEAR
BILIRUB UR QL STRIP: NEGATIVE
COLOR UR AUTO: YELLOW
GLUCOSE UR STRIP-MCNC: NEGATIVE MG/DL
HGB UR QL STRIP: ABNORMAL
KETONES UR STRIP-MCNC: NEGATIVE MG/DL
LEUKOCYTE ESTERASE UR QL STRIP: NEGATIVE
NITRATE UR QL: NEGATIVE
PH UR STRIP: 6 PH (ref 5–7)
RBC #/AREA URNS AUTO: NORMAL /HPF
SOURCE: ABNORMAL
SP GR UR STRIP: 1.01 (ref 1–1.03)
UROBILINOGEN UR STRIP-ACNC: 0.2 EU/DL (ref 0.2–1)
WBC #/AREA URNS AUTO: NORMAL /HPF

## 2019-08-19 PROCEDURE — 81001 URINALYSIS AUTO W/SCOPE: CPT | Performed by: INTERNAL MEDICINE

## 2019-08-19 PROCEDURE — 99444 ZZC PHYSICIAN ONLINE EVALUATION & MANAGEMENT SERVICE: CPT | Performed by: INTERNAL MEDICINE

## 2019-08-19 RX ORDER — FLUCONAZOLE 150 MG/1
150 TABLET ORAL ONCE
Qty: 1 TABLET | Refills: 2 | Status: SHIPPED | OUTPATIENT
Start: 2019-08-19 | End: 2019-12-09

## 2019-08-19 RX ORDER — CIPROFLOXACIN 250 MG/1
250 TABLET, FILM COATED ORAL 2 TIMES DAILY
Qty: 14 TABLET | Refills: 0 | Status: SHIPPED | OUTPATIENT
Start: 2019-08-19 | End: 2020-01-17

## 2019-09-30 ENCOUNTER — HEALTH MAINTENANCE LETTER (OUTPATIENT)
Age: 69
End: 2019-09-30

## 2019-10-15 ENCOUNTER — TRANSFERRED RECORDS (OUTPATIENT)
Dept: HEALTH INFORMATION MANAGEMENT | Facility: CLINIC | Age: 69
End: 2019-10-15

## 2019-12-09 ENCOUNTER — E-VISIT (OUTPATIENT)
Dept: INTERNAL MEDICINE | Facility: CLINIC | Age: 69
End: 2019-12-09
Payer: COMMERCIAL

## 2019-12-09 DIAGNOSIS — B37.31 YEAST INFECTION OF THE VAGINA: ICD-10-CM

## 2019-12-09 PROCEDURE — 99444 ZZC PHYSICIAN ONLINE EVALUATION & MANAGEMENT SERVICE: CPT | Performed by: INTERNAL MEDICINE

## 2019-12-09 RX ORDER — FLUCONAZOLE 150 MG/1
150 TABLET ORAL ONCE
Qty: 1 TABLET | Refills: 4 | Status: SHIPPED | OUTPATIENT
Start: 2019-12-09 | End: 2019-12-09

## 2019-12-09 NOTE — TELEPHONE ENCOUNTER
I called the patient if she want to cancel the E-visit today and do it again w dr Grady tomorrow.    She would like to keep this E-visit. She would like Fluconazole rx. Done

## 2020-01-14 ENCOUNTER — MYC MEDICAL ADVICE (OUTPATIENT)
Dept: INTERNAL MEDICINE | Facility: CLINIC | Age: 70
End: 2020-01-14

## 2020-01-17 ENCOUNTER — OFFICE VISIT (OUTPATIENT)
Dept: INTERNAL MEDICINE | Facility: CLINIC | Age: 70
End: 2020-01-17
Payer: COMMERCIAL

## 2020-01-17 VITALS
HEART RATE: 80 BPM | OXYGEN SATURATION: 99 % | DIASTOLIC BLOOD PRESSURE: 76 MMHG | WEIGHT: 139.4 LBS | BODY MASS INDEX: 24.7 KG/M2 | SYSTOLIC BLOOD PRESSURE: 125 MMHG | RESPIRATION RATE: 16 BRPM | TEMPERATURE: 97.7 F | HEIGHT: 63 IN

## 2020-01-17 DIAGNOSIS — R05.9 COUGH: ICD-10-CM

## 2020-01-17 DIAGNOSIS — R82.90 ABNORMAL URINE ODOR: ICD-10-CM

## 2020-01-17 DIAGNOSIS — A49.8 GARDNERELLA INFECTION: ICD-10-CM

## 2020-01-17 DIAGNOSIS — N76.0 VAGINITIS AND VULVOVAGINITIS: Primary | ICD-10-CM

## 2020-01-17 LAB
ALBUMIN UR-MCNC: NEGATIVE MG/DL
APPEARANCE UR: CLEAR
BILIRUB UR QL STRIP: NEGATIVE
COLOR UR AUTO: YELLOW
GLUCOSE UR STRIP-MCNC: NEGATIVE MG/DL
HGB UR QL STRIP: ABNORMAL
KETONES UR STRIP-MCNC: NEGATIVE MG/DL
LEUKOCYTE ESTERASE UR QL STRIP: NEGATIVE
NITRATE UR QL: NEGATIVE
NON-SQ EPI CELLS #/AREA URNS LPF: NORMAL /LPF
PH UR STRIP: 5.5 PH (ref 5–7)
RBC #/AREA URNS AUTO: NORMAL /HPF
SOURCE: ABNORMAL
SP GR UR STRIP: 1.01 (ref 1–1.03)
SPECIMEN SOURCE: ABNORMAL
UROBILINOGEN UR STRIP-ACNC: 0.2 EU/DL (ref 0.2–1)
WBC #/AREA URNS AUTO: NORMAL /HPF
WET PREP SPEC: ABNORMAL

## 2020-01-17 PROCEDURE — 81001 URINALYSIS AUTO W/SCOPE: CPT | Performed by: INTERNAL MEDICINE

## 2020-01-17 PROCEDURE — 99214 OFFICE O/P EST MOD 30 MIN: CPT | Performed by: INTERNAL MEDICINE

## 2020-01-17 PROCEDURE — 87210 SMEAR WET MOUNT SALINE/INK: CPT | Performed by: INTERNAL MEDICINE

## 2020-01-17 RX ORDER — FLUCONAZOLE 150 MG/1
150 TABLET ORAL ONCE
COMMUNITY
End: 2020-01-17

## 2020-01-17 RX ORDER — METRONIDAZOLE 7.5 MG/G
1 GEL VAGINAL AT BEDTIME
Qty: 70 G | Refills: 0 | Status: SHIPPED | OUTPATIENT
Start: 2020-01-17 | End: 2021-04-22

## 2020-01-17 ASSESSMENT — MIFFLIN-ST. JEOR: SCORE: 1136.44

## 2020-01-17 NOTE — PROGRESS NOTES
Subjective     Vilma Hughes is a 69 year old female who presents to clinic today for the following health issues:    HPI   1. Urinary odor: concerned this may be a UTI as she sometimes had a difficult time telling she has had them in the past.     2.  Concerned about possible yeast infection: She had symptoms after having a lot of amoxicillin for dental work several days in a row a month ago.  She was treated with Diflucan.  She complains she has had some minimal discharge since then but she has a lot of itching around the area is concerned she still has an infection.       3.  She had a cough that is finally going away but she initially scheduled the appointment about that.  Cough has been nonproductive but annoying.  Any other significant persistent symptoms like sinus pain, postnasal drainage, rhinorrhea.      She has some questions regarding travel immunizations.     Patient Active Problem List   Diagnosis     Diffuse cystic mastopathy     Ebstein's anomaly     Advanced directives, counseling/discussion     Nonintractable migraine, unspecified migraine type     CARDIOVASCULAR SCREENING; LDL GOAL LESS THAN 130     Current Outpatient Medications   Medication Sig Dispense Refill     SUMAtriptan (IMITREX) 50 MG tablet Take 1 tablet (50 mg) by mouth at onset of headache for migraine May repeat dose in 2 hours.  Do not exceed 200 mg in 24 hours 18 tablet 11      Social History     Tobacco Use     Smoking status: Never Smoker     Smokeless tobacco: Never Used   Substance Use Topics     Alcohol use: Yes     Comment: occasionally     Drug use: No          Reviewed and updated as needed this visit by Provider         Review of Systems   No fever, chills, sinus pain, postnasal drainage, chest pain, shortness of breath, no dysuria, frequency, urgency or blood in the urine      Objective    /76 (BP Location: Left arm, Patient Position: Sitting, Cuff Size: Adult Regular)   Pulse 80   Temp 97.7  F (36.5  C) (Oral)    "Resp 16   Ht 1.6 m (5' 3\")   Wt 63.2 kg (139 lb 6.4 oz)   LMP 08/18/2004   SpO2 99%   BMI 24.69 kg/m    Body mass index is 24.69 kg/m .  Physical Exam     Lungs: Clear without wheezes  External genitalia with some erythematous rash extending from the labia down towards the rectum.   Vaginal mucosa is without significant erythema, exudate present    UA: Negative nitrite, leukocyte Estrace, 0-5 white blood cells, 0-2 red blood cells    Wet prep: Small number of clue cells, negative yeast          Assessment & Plan     1. Vaginitis and vulvovaginitis  Testing shows Gardnerella, will treat with MetroGel, she does have some rash externally, recommend hydrocortisone cream  - Wet prep    2. Abnormal urine odor  Reassured urine is clear  - UA reflex to Microscopic and Culture  - Urine Microscopic    3. Gardnerella infection  As above  - metroNIDAZOLE (METROGEL) 0.75 % vaginal gel; Place 1 applicator (5 g) vaginally At Bedtime  Dispense: 70 g; Refill: 0     4.  Cough: Likely resolving viral infection.  Over-the-counter as needed, follow-up as needed      Return in about 23 weeks (around 6/26/2020) for Physical Exam.    Vilma Grady MD  [facility]        "

## 2020-01-17 NOTE — NURSING NOTE
"/76 (BP Location: Left arm, Patient Position: Sitting, Cuff Size: Adult Regular)   Pulse 80   Temp 97.7  F (36.5  C) (Oral)   Resp 16   Ht 1.6 m (5' 3\")   Wt 63.2 kg (139 lb 6.4 oz)   LMP 08/18/2004   SpO2 99%   BMI 24.69 kg/m      "

## 2021-01-15 ENCOUNTER — HEALTH MAINTENANCE LETTER (OUTPATIENT)
Age: 71
End: 2021-01-15

## 2021-03-20 ENCOUNTER — HEALTH MAINTENANCE LETTER (OUTPATIENT)
Age: 71
End: 2021-03-20

## 2021-04-19 ENCOUNTER — TRANSFERRED RECORDS (OUTPATIENT)
Dept: HEALTH INFORMATION MANAGEMENT | Facility: CLINIC | Age: 71
End: 2021-04-19

## 2021-04-22 ENCOUNTER — MYC MEDICAL ADVICE (OUTPATIENT)
Dept: INTERNAL MEDICINE | Facility: CLINIC | Age: 71
End: 2021-04-22

## 2021-04-22 ENCOUNTER — OFFICE VISIT (OUTPATIENT)
Dept: INTERNAL MEDICINE | Facility: CLINIC | Age: 71
End: 2021-04-22
Payer: COMMERCIAL

## 2021-04-22 VITALS
BODY MASS INDEX: 23.04 KG/M2 | TEMPERATURE: 97.7 F | RESPIRATION RATE: 18 BRPM | OXYGEN SATURATION: 100 % | WEIGHT: 130 LBS | SYSTOLIC BLOOD PRESSURE: 145 MMHG | HEIGHT: 63 IN | DIASTOLIC BLOOD PRESSURE: 80 MMHG | HEART RATE: 78 BPM

## 2021-04-22 DIAGNOSIS — G43.009 NONINTRACTABLE MIGRAINE, UNSPECIFIED MIGRAINE TYPE: ICD-10-CM

## 2021-04-22 DIAGNOSIS — R35.0 URINARY FREQUENCY: ICD-10-CM

## 2021-04-22 DIAGNOSIS — Z00.00 ENCOUNTER FOR ANNUAL WELLNESS EXAM IN MEDICARE PATIENT: Primary | ICD-10-CM

## 2021-04-22 DIAGNOSIS — L65.9 HAIR LOSS: ICD-10-CM

## 2021-04-22 DIAGNOSIS — N89.8 VAGINAL DISCHARGE: ICD-10-CM

## 2021-04-22 DIAGNOSIS — Z13.1 SCREENING FOR DIABETES MELLITUS: ICD-10-CM

## 2021-04-22 DIAGNOSIS — Z13.6 CARDIOVASCULAR SCREENING; LDL GOAL LESS THAN 130: ICD-10-CM

## 2021-04-22 LAB
ALBUMIN UR-MCNC: NEGATIVE MG/DL
ANION GAP SERPL CALCULATED.3IONS-SCNC: <1 MMOL/L (ref 3–14)
APPEARANCE UR: CLEAR
BILIRUB UR QL STRIP: NEGATIVE
BUN SERPL-MCNC: 17 MG/DL (ref 7–30)
CALCIUM SERPL-MCNC: 9.6 MG/DL (ref 8.5–10.1)
CHLORIDE SERPL-SCNC: 109 MMOL/L (ref 94–109)
CHOLEST SERPL-MCNC: 223 MG/DL
CO2 SERPL-SCNC: 30 MMOL/L (ref 20–32)
COLOR UR AUTO: YELLOW
CREAT SERPL-MCNC: 0.86 MG/DL (ref 0.52–1.04)
GFR SERPL CREATININE-BSD FRML MDRD: 69 ML/MIN/{1.73_M2}
GLUCOSE SERPL-MCNC: 111 MG/DL (ref 70–99)
GLUCOSE UR STRIP-MCNC: NEGATIVE MG/DL
HDLC SERPL-MCNC: 72 MG/DL
HGB UR QL STRIP: NEGATIVE
KETONES UR STRIP-MCNC: NEGATIVE MG/DL
LDLC SERPL CALC-MCNC: 136 MG/DL
LEUKOCYTE ESTERASE UR QL STRIP: NEGATIVE
NITRATE UR QL: NEGATIVE
NONHDLC SERPL-MCNC: 151 MG/DL
PH UR STRIP: 6 PH (ref 5–7)
POTASSIUM SERPL-SCNC: 4.4 MMOL/L (ref 3.4–5.3)
SODIUM SERPL-SCNC: 139 MMOL/L (ref 133–144)
SOURCE: NORMAL
SP GR UR STRIP: <=1.005 (ref 1–1.03)
SPECIMEN SOURCE: NORMAL
TRIGL SERPL-MCNC: 75 MG/DL
TSH SERPL DL<=0.005 MIU/L-ACNC: 1.55 MU/L (ref 0.4–4)
UROBILINOGEN UR STRIP-ACNC: 0.2 EU/DL (ref 0.2–1)
WET PREP SPEC: NORMAL

## 2021-04-22 PROCEDURE — 36415 COLL VENOUS BLD VENIPUNCTURE: CPT | Performed by: INTERNAL MEDICINE

## 2021-04-22 PROCEDURE — 87210 SMEAR WET MOUNT SALINE/INK: CPT | Performed by: INTERNAL MEDICINE

## 2021-04-22 PROCEDURE — 84443 ASSAY THYROID STIM HORMONE: CPT | Performed by: INTERNAL MEDICINE

## 2021-04-22 PROCEDURE — 99213 OFFICE O/P EST LOW 20 MIN: CPT | Mod: 25 | Performed by: INTERNAL MEDICINE

## 2021-04-22 PROCEDURE — 90714 TD VACC NO PRESV 7 YRS+ IM: CPT | Performed by: INTERNAL MEDICINE

## 2021-04-22 PROCEDURE — 90471 IMMUNIZATION ADMIN: CPT | Performed by: INTERNAL MEDICINE

## 2021-04-22 PROCEDURE — 99397 PER PM REEVAL EST PAT 65+ YR: CPT | Mod: 25 | Performed by: INTERNAL MEDICINE

## 2021-04-22 PROCEDURE — 81003 URINALYSIS AUTO W/O SCOPE: CPT | Performed by: INTERNAL MEDICINE

## 2021-04-22 PROCEDURE — 80048 BASIC METABOLIC PNL TOTAL CA: CPT | Performed by: INTERNAL MEDICINE

## 2021-04-22 PROCEDURE — 80061 LIPID PANEL: CPT | Performed by: INTERNAL MEDICINE

## 2021-04-22 RX ORDER — CALCIUM CARBONATE 500(1250)
1 TABLET ORAL DAILY
COMMUNITY

## 2021-04-22 RX ORDER — AMOXICILLIN 500 MG/1
500 CAPSULE ORAL PRN
COMMUNITY
End: 2022-08-28

## 2021-04-22 RX ORDER — MULTIPLE VITAMINS W/ MINERALS TAB 9MG-400MCG
1 TAB ORAL DAILY
COMMUNITY

## 2021-04-22 RX ORDER — SUMATRIPTAN 50 MG/1
50 TABLET, FILM COATED ORAL
Qty: 18 TABLET | Refills: 11 | Status: SHIPPED | OUTPATIENT
Start: 2021-04-22 | End: 2022-08-19

## 2021-04-22 ASSESSMENT — ENCOUNTER SYMPTOMS
CHILLS: 0
SORE THROAT: 0
FREQUENCY: 1
JOINT SWELLING: 0
HEARTBURN: 0
BREAST MASS: 0
COUGH: 0
MYALGIAS: 0
ABDOMINAL PAIN: 0
HEMATURIA: 0
WEAKNESS: 0
EYE PAIN: 0
CONSTIPATION: 0
HEADACHES: 0
SHORTNESS OF BREATH: 0
DIARRHEA: 0
DIZZINESS: 0
PALPITATIONS: 0
FEVER: 0
ARTHRALGIAS: 1
PARESTHESIAS: 0
DYSURIA: 0
NAUSEA: 0
NERVOUS/ANXIOUS: 0

## 2021-04-22 ASSESSMENT — MIFFLIN-ST. JEOR: SCORE: 1090.39

## 2021-04-22 ASSESSMENT — ACTIVITIES OF DAILY LIVING (ADL): CURRENT_FUNCTION: NO ASSISTANCE NEEDED

## 2021-04-22 NOTE — PROGRESS NOTES
"SUBJECTIVE:   Vilma Hughes is a 70 year old female who presents for Preventive Visit.      Patient has been advised of split billing requirements and indicates understanding: Yes   Are you in the first 12 months of your Medicare coverage?  No    Healthy Habits:     In general, how would you rate your overall health?  Good    Frequency of exercise:  6-7 days/week    Duration of exercise:  45-60 minutes    Do you usually eat at least 4 servings of fruit and vegetables a day, include whole grains    & fiber and avoid regularly eating high fat or \"junk\" foods?  No    Medication side effects:  Not applicable    Ability to successfully perform activities of daily living:  No assistance needed    Home Safety:  No safety concerns identified    Hearing Impairment:  Need to ask people to speak up or repeat themselves    In the past 6 months, have you been bothered by leaking of urine? Yes    In general, how would you rate your overall mental or emotional health?  Excellent      PHQ-2 Total Score: 0    Additional concerns today:  Yes    Do you feel safe in your environment? Yes    Have you ever done Advance Care Planning? (For example, a Health Directive, POLST, or a discussion with a medical provider or your loved ones about your wishes): No, advance care planning information given to patient to review.  Patient plans to discuss their wishes with loved ones or provider.         Fall risk  Fallen 2 or more times in the past year?: No  Any fall with injury in the past year?: No    Cognitive Screening   1) Repeat 3 items (Leader, Season, Table)    2) Clock draw: NORMAL  3) 3 item recall: Recalls 2 objects   Results: NORMAL clock, 1-2 items recalled: COGNITIVE IMPAIRMENT LESS LIKELY    Mini-CogTM Copyright ALICIA Reed. Licensed by the author for use in [facility]; reprinted with permission (tariq@.[email or web address]). All rights reserved.      Do you have sleep apnea, excessive snoring or daytime drowsiness?: no    Reviewed and " updated as needed this visit by clinical staff                 Current concerns:   BP was elevated today but it was normal at the dentist 2 days ago, she thinks she is a little anxious coming here.   She had a few episodes of some vaginal irritation over the winter. She was treated with metronidazole but never had a swab done. Mild urinary urgency with it    Patient Active Problem List   Diagnosis     Diffuse cystic mastopathy     Ebstein's anomaly     Advanced directives, counseling/discussion     Nonintractable migraine, unspecified migraine type     CARDIOVASCULAR SCREENING; LDL GOAL LESS THAN 130     Current Outpatient Medications   Medication Sig Dispense Refill     amoxicillin (AMOXIL) 500 MG capsule Take 500 mg by mouth as needed       calcium carbonate (OS-RIKA) 500 MG tablet Take 1 tablet by mouth 2 times daily       multivitamin w/minerals (MULTI-VITAMIN) tablet Take 1 tablet by mouth daily       SUMAtriptan (IMITREX) 50 MG tablet Take 1 tablet (50 mg) by mouth at onset of headache for migraine May repeat dose in 2 hours.  Do not exceed 200 mg in 24 hours 18 tablet 11     Zinc 25 MG TABS             Reviewed and updated as needed this visit by Provider                Social History     Tobacco Use     Smoking status: Never Smoker     Smokeless tobacco: Never Used   Substance Use Topics     Alcohol use: Yes     Comment: occasionally     If you drink alcohol do you typically have >3 drinks per day or >7 drinks per week? No    Alcohol Use 4/22/2021   Prescreen: >3 drinks/day or >7 drinks/week? No   Prescreen: >3 drinks/day or >7 drinks/week? -   No flowsheet data found.            Current providers sharing in care for this patient include:   Patient Care Team:  Vilma Grady MD as PCP - General (Internal Medicine)  Vilma Grady MD as Assigned PCP    The following health maintenance items are reviewed in Epic and correct as of today:  Health Maintenance Due   Topic Date Due     ANNUAL REVIEW OF HM ORDERS   "Never done     COVID-19 Vaccine (1) Never done     ADVANCE CARE PLANNING  08/14/2017     MEDICARE ANNUAL WELLNESS VISIT  06/25/2020     FALL RISK ASSESSMENT  06/25/2020     INFLUENZA VACCINE (1) 09/01/2020     DTAP/TDAP/TD IMMUNIZATION (3 - Td) 01/10/2021         Any new diagnosis of family breast, ovarian, or bowel cancer? No    FSH-7: No flowsheet data found.    Mammogram Screening: Recommended mammography every 1-2 years with patient discussion and risk factor consideration  Pertinent mammograms are reviewed under the imaging tab.    Review of Systems   Constitutional: Negative for chills and fever.   HENT: Negative for congestion, ear pain, hearing loss and sore throat.    Eyes: Negative for pain and visual disturbance.   Respiratory: Negative for cough and shortness of breath.    Cardiovascular: Negative for chest pain, palpitations and peripheral edema.   Gastrointestinal: Negative for abdominal pain, constipation, diarrhea, heartburn and nausea.   Breasts:  Negative for tenderness, breast mass and discharge.   Genitourinary: Positive for frequency, pelvic pain and urgency. Negative for dysuria, genital sores, hematuria, vaginal bleeding and vaginal discharge.   Musculoskeletal: Positive for arthralgias. Negative for joint swelling and myalgias.   Skin: Negative for rash.   Neurological: Negative for dizziness, weakness, headaches and paresthesias.   Psychiatric/Behavioral: Negative for mood changes. The patient is not nervous/anxious.      She reports hair loss    OBJECTIVE:   BP (!) 145/80 (BP Location: Right arm, Patient Position: Sitting, Cuff Size: Adult Regular)   Pulse 78   Temp 97.7  F (36.5  C) (Oral)   Resp 18   Ht 1.603 m (5' 3.1\")   Wt 59 kg (130 lb)   LMP 08/18/2004   SpO2 100%   BMI 22.96 kg/m   Estimated body mass index is 24.69 kg/m  as calculated from the following:    Height as of 1/17/20: 1.6 m (5' 3\").    Weight as of 1/17/20: 63.2 kg (139 lb 6.4 oz).  Physical Exam      HEENT: " "extraocular movements are intact, pupils equal and reactive to light and accommodation, TMs clear  NECK: Neck supple. No adenopathy. Thyroid symmetric, normal size,, Carotids without bruits.  PULMONARY: clear to auscultation  CARDIAC: regular rate and rhythm and no murmurs, clicks, or gallops  PULSES: 2/2 throughout  BACK: no spinal or CVAT  ABDOMINAL: Soft, nontender.  Normal bowel sounds.  No hepatosplenomegaly or abnormal masses  BREAST: No breast masses or tenderness, No axillary masses or tenderness and No galactorrhea  REFLEXES: 2+ throughout         ASSESSMENT / PLAN:   1. Encounter for annual wellness exam in Medicare patient  Up to date    2. Urinary frequency  Check urine  - UA reflex to Microscopic and Culture    3. Nonintractable migraine, unspecified migraine type  Stable, continue med  - SUMAtriptan (IMITREX) 50 MG tablet; Take 1 tablet (50 mg) by mouth at onset of headache for migraine May repeat dose in 2 hours.  Do not exceed 200 mg in 24 hours  Dispense: 18 tablet; Refill: 11    4. Vaginal discharge  Check wet prep  - Wet prep    5. Hair loss    - TSH with free T4 reflex    6. CARDIOVASCULAR SCREENING; LDL GOAL LESS THAN 130    - Lipid panel reflex to direct LDL Fasting    7. Screening for diabetes mellitus    - Basic metabolic panel  (Ca, Cl, CO2, Creat, Gluc, K, Na, BUN)    Patient has been advised of split billing requirements and indicates understanding: Yes  COUNSELING:  Reviewed preventive health counseling, as reflected in patient instructions    Estimated body mass index is 24.69 kg/m  as calculated from the following:    Height as of 1/17/20: 1.6 m (5' 3\").    Weight as of 1/17/20: 63.2 kg (139 lb 6.4 oz).        She reports that she has never smoked. She has never used smokeless tobacco.      Appropriate preventive services were discussed with this patient, including applicable screening as appropriate for cardiovascular disease, diabetes, osteopenia/osteoporosis, and glaucoma.  As " appropriate for age/gender, discussed screening for colorectal cancer, prostate cancer, breast cancer, and cervical cancer. Checklist reviewing preventive services available has been given to the patient.    Reviewed patients plan of care and provided an AVS. The Basic Care Plan (routine screening as documented in Health Maintenance) for Vilma meets the Care Plan requirement. This Care Plan has been established and reviewed with the Patient.    Counseling Resources:  ATP IV Guidelines  Pooled Cohorts Equation Calculator  Breast Cancer Risk Calculator  Breast Cancer: Medication to Reduce Risk  FRAX Risk Assessment  ICSI Preventive Guidelines  Dietary Guidelines for Americans, 2010  USDA's MyPlate  ASA Prophylaxis  Lung CA Screening    Vilma Grady MD  [facility]    Identified Health Risks:

## 2021-04-22 NOTE — NURSING NOTE
"BP (!) 145/80 (BP Location: Right arm, Patient Position: Sitting, Cuff Size: Adult Regular)   Pulse 78   Temp 97.7  F (36.5  C) (Oral)   Resp 18   Ht 1.603 m (5' 3.1\")   Wt 59 kg (130 lb)   LMP 08/18/2004   SpO2 100%   BMI 22.96 kg/m      "

## 2021-05-11 ENCOUNTER — MYC MEDICAL ADVICE (OUTPATIENT)
Dept: INTERNAL MEDICINE | Facility: CLINIC | Age: 71
End: 2021-05-11

## 2021-05-21 ENCOUNTER — TRANSFERRED RECORDS (OUTPATIENT)
Dept: HEALTH INFORMATION MANAGEMENT | Facility: CLINIC | Age: 71
End: 2021-05-21

## 2021-07-08 ENCOUNTER — OFFICE VISIT (OUTPATIENT)
Dept: INTERNAL MEDICINE | Facility: CLINIC | Age: 71
End: 2021-07-08
Payer: COMMERCIAL

## 2021-07-08 VITALS
DIASTOLIC BLOOD PRESSURE: 78 MMHG | OXYGEN SATURATION: 99 % | HEIGHT: 63 IN | SYSTOLIC BLOOD PRESSURE: 138 MMHG | WEIGHT: 140.8 LBS | RESPIRATION RATE: 18 BRPM | BODY MASS INDEX: 24.95 KG/M2 | TEMPERATURE: 98.1 F | HEART RATE: 79 BPM

## 2021-07-08 DIAGNOSIS — M25.551 RIGHT HIP PAIN: Primary | ICD-10-CM

## 2021-07-08 PROCEDURE — 99213 OFFICE O/P EST LOW 20 MIN: CPT | Performed by: INTERNAL MEDICINE

## 2021-07-08 ASSESSMENT — MIFFLIN-ST. JEOR: SCORE: 1139.37

## 2021-07-08 NOTE — PROGRESS NOTES
"    Assessment & Plan     Right hip pain  She has had continued symptoms despite working on exercises for trochanteric bursitis, may still be cause but could be other issue. Recommend refer ortho now. Recommend lidocaine patch at [facility].   - Orthopedic  Referral; Future    Hair loss: likely aging, reassured thyroid was normal.   Could try rogaine        Return in about 9 months (around 4/23/2022) for Wellness visit.    Vilma Grady MD  [facility]   Vilma is a 70 year old who presents for the following health issues     HPI     1. Continued right hip pain: this is mostly lateral hip, a little buttock and some lateral thigh. She has tried to do the stretches and exercises I had recommended but not helping. No pain in the groin, hurts more at rest and night.     2. Hair loss: wondering if thyroid, did not realize we had checked in the past.     Patient Active Problem List   Diagnosis     Diffuse cystic mastopathy     Ebstein's anomaly     Advanced directives, counseling/discussion     Nonintractable migraine, unspecified migraine type     CARDIOVASCULAR SCREENING; LDL GOAL LESS THAN 130     Current Outpatient Medications   Medication Sig Dispense Refill     amoxicillin (AMOXIL) 500 MG capsule Take 500 mg by mouth as needed       calcium carbonate (OS-RIKA) 500 MG tablet Take 1 tablet by mouth 2 times daily       multivitamin w/minerals (MULTI-VITAMIN) tablet Take 1 tablet by mouth daily       SUMAtriptan (IMITREX) 50 MG tablet Take 1 tablet (50 mg) by mouth at onset of headache for migraine May repeat dose in 2 hours.  Do not exceed 200 mg in 24 hours 18 tablet 11     Zinc 25 MG TABS           Review of Systems   No fever, chills, numbness, tingling in leg, no low back pain       Objective    /78 (BP Location: Left arm, Patient Position: Sitting, Cuff Size: Adult Regular)   Pulse 79   Temp 98.1  F (36.7  C) (Oral)   Resp 18   Ht 1.603 m (5' 3.1\")   Wt 63.9 kg (140 " lb 12.8 oz)   LMP 08/18/2004   SpO2 99%   BMI 24.86 kg/m    There is no height or weight on file to calculate BMI.  Physical Exam       Tender lateral hip, no tenderness of hip joint, good ROM hip.

## 2021-07-29 ENCOUNTER — OFFICE VISIT (OUTPATIENT)
Dept: ORTHOPEDICS | Facility: CLINIC | Age: 71
End: 2021-07-29
Attending: INTERNAL MEDICINE
Payer: COMMERCIAL

## 2021-07-29 ENCOUNTER — ANCILLARY PROCEDURE (OUTPATIENT)
Dept: GENERAL RADIOLOGY | Facility: CLINIC | Age: 71
End: 2021-07-29
Attending: ORTHOPAEDIC SURGERY
Payer: COMMERCIAL

## 2021-07-29 VITALS
DIASTOLIC BLOOD PRESSURE: 82 MMHG | BODY MASS INDEX: 24.8 KG/M2 | WEIGHT: 140 LBS | SYSTOLIC BLOOD PRESSURE: 114 MMHG | HEIGHT: 63 IN

## 2021-07-29 DIAGNOSIS — M25.551 RIGHT HIP PAIN: ICD-10-CM

## 2021-07-29 DIAGNOSIS — M70.61 TROCHANTERIC BURSITIS OF RIGHT HIP: Primary | ICD-10-CM

## 2021-07-29 PROCEDURE — 73502 X-RAY EXAM HIP UNI 2-3 VIEWS: CPT | Mod: RT | Performed by: ORTHOPAEDIC SURGERY

## 2021-07-29 PROCEDURE — 99203 OFFICE O/P NEW LOW 30 MIN: CPT | Performed by: ORTHOPAEDIC SURGERY

## 2021-07-29 ASSESSMENT — MIFFLIN-ST. JEOR: SCORE: 1135.75

## 2021-07-29 NOTE — LETTER
7/29/2021         RE: Vilma Hughes  [address]  [place] [address]        Dear Colleague,    Thank you for referring your patient, Vilma Hughes, to the [facility] [place]. Please see a copy of my visit note below.    HISTORY OF PRESENT ILLNESS:    Vilma Hughes is a 70 year old female who is seen in consultation at the request of Dr. Grady for right hip pain. Patient reports pain began in January 2021. Patient states she was doing core exercises including lots of twisting when she noticed pain in her right hip. Pain is located in right lateral hip. Pain increases with sleeping on right side. Pain improves with activity avoidance / switching positions. Pain at worst is 4/10 and currently is 2/10.    She denies any trauma to the lateral aspect of the hip.    Present symptoms: numbness in bilateral feet (occurs very rarely)  Treatments tried to this point: rest / activity avoidance, xray 6/12/2015  Orthopedic PMH: left leg neuropathy    Past Medical History:   Diagnosis Date     Atypical nevus 8/16     Diffuse cystic mastopathy      Ebstein's anomaly      Palpitations      Spider veins      Thoracic or lumbosacral neuritis or radiculitis, unspecified     L leg neuropathy     Tubular adenoma 1/16       Past Surgical History:   Procedure Laterality Date     COLONOSCOPY N/A 1/29/2016    Procedure: COMBINED COLONOSCOPY, SINGLE OR MULTIPLE BIOPSY/POLYPECTOMY BY BIOPSY;  Surgeon: Margarette Millan MD;  Location: [facility]     HC BIOPSY OF BREAST, OPEN INCISIONAL       HC REMOVAL OF TONSILS,<13 Y/O      Tonsils <14y.o.     PELVIS LAPAROSCOPY,DX         Family History   Problem Relation Age of Onset     Cancer Father         brain tumor     C.A.D. Father         MI@70     Alzheimer Disease Mother      Arthritis Mother         RA     Hypertension Mother      Cancer - colorectal Sister 62       Social History     Socioeconomic History     Marital status:      Spouse  name: Not on file     Number of children: Not on file     Years of education: Not on file     Highest education level: Not on file   Occupational History     Not on file   Tobacco Use     Smoking status: Never Smoker     Smokeless tobacco: Never Used   Substance and Sexual Activity     Alcohol use: Yes     Comment: occasionally     Drug use: No     Sexual activity: Yes     Partners: Male   Other Topics Concern     Parent/sibling w/ CABG, MI or angioplasty before 65F 55M? Not Asked   Social History Narrative     Not on file     Social Determinants of Health     Financial Resource Strain:      Difficulty of Paying Living Expenses:    Food Insecurity:      Worried About Running Out of Food in the Last Year:      Ran Out of Food in the Last Year:    Transportation Needs:      Lack of Transportation (Medical):      Lack of Transportation (Non-Medical):    Physical Activity:      Days of Exercise per Week:      Minutes of Exercise per Session:    Stress:      Feeling of Stress :    Social Connections:      Frequency of Communication with Friends and Family:      Frequency of Social Gatherings with Friends and Family:      Attends Judaism Services:      Active Member of Clubs or Organizations:      Attends Club or Organization Meetings:      Marital Status:    Intimate Partner Violence:      Fear of Current or Ex-Partner:      Emotionally Abused:      Physically Abused:      Sexually Abused:        Current Outpatient Medications   Medication Sig Dispense Refill     amoxicillin (AMOXIL) 500 MG capsule Take 500 mg by mouth as needed       calcium carbonate (OS-RIKA) 500 MG tablet Take 1 tablet by mouth 2 times daily       multivitamin w/minerals (MULTI-VITAMIN) tablet Take 1 tablet by mouth daily       SUMAtriptan (IMITREX) 50 MG tablet Take 1 tablet (50 mg) by mouth at onset of headache for migraine May repeat dose in 2 hours.  Do not exceed 200 mg in 24 hours 18 tablet 11     Zinc 25 MG TABS          Allergies   Allergen  "Reactions     Fentanyl Nausea and Vomiting       REVIEW OF SYSTEMS:  CONSTITUTIONAL:  NEGATIVE for fever, chills, change in weight  INTEGUMENTARY/SKIN:  NEGATIVE for worrisome rashes, moles or lesions  EYES:  NEGATIVE for vision changes or irritation  ENT/MOUTH:  NEGATIVE for ear, mouth and throat problems  RESP:  NEGATIVE for significant cough or SOB  BREAST:  NEGATIVE for masses, tenderness or discharge  CV:  NEGATIVE for chest pain, palpitations or peripheral edema  GI:  NEGATIVE for nausea, abdominal pain, heartburn, or change in bowel habits  :  Negative   MUSCULOSKELETAL:  See HPI above  NEURO:  NEGATIVE for weakness, dizziness or paresthesias  ENDOCRINE:  NEGATIVE for temperature intolerance, skin/hair changes  HEME/ALLERGY/IMMUNE:  NEGATIVE for bleeding problems  PSYCHIATRIC:  NEGATIVE for changes in mood or affect      PHYSICAL EXAM:  /82   Ht 1.603 m (5' 3.1\")   Wt 63.5 kg (140 lb)   LMP 08/18/2004   BMI 24.72 kg/m    Body mass index is 24.72 kg/m .   GENERAL APPEARANCE: healthy, alert and no distress   HEENT: No apparent thyroid megaly. Clear sclera with normal ocular movement  RESPIRATORY: No labored breathing  SKIN: no suspicious lesions or rashes  NEURO: Normal strength and tone, mentation intact and speech normal  VASCULAR: Good pulses, and capillary refill   LYMPH: no lymphadenopathy   PSYCH:  mentation appears normal and affect normal/bright    MUSCULOSKELETAL:  Not in acute distress  Normal gait  No difficulty getting up from sitting  Full range of motion of hips without pain especially with internal and external rotation movement  Straight leg raising is negative  Femoral stretch test is negative  Gross motor function is full  No swelling of the lower extremity  Skin is intact  Sensation is intact    Tenderness at the greater trochanteric region  Right hip abduction strength is well-maintained      ASSESSMENT:  Chronic relatively mild greater trochanteric hip bursitis, " right    PLAN:  X-rays of the right hip from today were visualized.  No significant pathology was noted.  Compared to the x-rays from 2015, the joint spaces in both hips have been well-maintained.  There is no evidence of osteoporosis with satisfactory thickness of the diaphyseal cortical bone thickness.    We then had a long discussion about the bursitis of the hip.  Informational materials provided.    We recommend stretching exercises.  Given her pain level, at this point, cortisone injection was not felt to be necessary however if her pain gets worse, will revisit that option.    She expressed her understanding of our discussion.  Follow-up as needed..    Imaging Interpretation:   Unremarkable      Zac Valdes MD  Department of Orthopedic Surgery        Disclaimer: This note consists of symbols derived from keyboarding, dictation and/or voice recognition software. As a result, there may be errors in the script that have gone undetected. Please consider this when interpreting information found in this chart.        Again, thank you for allowing me to participate in the care of your patient.        Sincerely,        Zac Valdes MD

## 2021-07-29 NOTE — PATIENT INSTRUCTIONS
Patient Education     Understanding Trochanteric Bursitis    A bursa is a thin, slippery, sac-like film. It contains a small amount of fluid. This structure is found between bones and soft tissues in and around joints. A bursa cushions and protects a joint. It keeps parts of a joint from rubbing against each other. If a bursa becomes inflamed and irritated, it's known as bursitis.   The trochanteric bursa is found on the hip joint. It lies on top of the bump at the top of the thighbone called the greater trochanter. Inflammation of this bursa is called trochanteric bursitis.   How to say it   [id number]  Causes of trochanteric bursitis  Causes may include:    Overuse of the hip during running or other sports, dance, or work    Falling on or irritation to the side of the hip  This condition may occur along with other problems, such as osteoarthritis of the hip or knee, or low back problems. In rare cases, it may occur after hip surgery.   Symptoms of trochanteric bursitis    Pain or aching on the side of the hip. It's often felt as a sharp, intense pain. The pain may travel down the leg.    Swelling, tenderness, or warmth on the side of the hip at the bony bump at the top of the thigh    Treatment for trochanteric bursitis  These may include:    Resting the hip. This allows the bursa to heal.    Prescription or over-the-counter pain medicines. These help reduce inflammation, swelling, and pain. NSAIDs (nonsteroidal anti-inflammatory drugs) are the most common medicines used. Medicines may be prescribed or bought over the counter. They may be given as pills. Or they may be put on the skin as a gel, cream, or patch.    Cold packs and heat packs. These help reduce pain and swelling.    Stretching and strengthening exercises. These improve flexibility and strength around the hip.    Physical therapy. This includes exercises or other treatments.    Injections of medicine into the bursa.  This may help reduce  inflammation and relieve symptoms. The medicine is usually a corticosteroid. This is a strong anti-inflammatory medicine.  Possible complications  If you don t give your hip time to heal, the problem may not go away, may return, or may get worse. Rest and treat your hip as directed.   When to call your healthcare provider  Call your healthcare provider right away if you have any of these:    Fever of 100.4 F (38 C) or higher, or as directed by your provider    Redness, swelling, or warmth that gets worse    Symptoms that don t get better with prescribed medicines, or get worse    New symptoms  Clarissa last reviewed this educational content on 6/1/2019 2000-2021 [employer]. All rights reserved. This information is not intended as a substitute for professional medical care. Always follow your healthcare professional's instructions.           Patient Education     Iliotibial Band Stretch (Flexibility)     1. Stand next to a chair. Hold onto the chair with your right hand for support. Cross your right leg behind your left leg.  2. Lean your right hip toward the right. Feel the stretch at the outside of your hip.  3. Hold for 30 to 60 seconds. Then relax.  4. Repeat 2 times, or as instructed.  5. Switch sides and repeat.  6. Do this 3 times a day, or as instructed.     Tip: Don t bend forward or twist at the waist.   Clarissa last reviewed this educational content on 3/1/2020    7938-6534 [employer]. All rights reserved. This information is not intended as a substitute for professional medical care. Always follow your healthcare professional's instructions.

## 2021-07-29 NOTE — PROGRESS NOTES
HISTORY OF PRESENT ILLNESS:    Vilma Hughes is a 70 year old female who is seen in consultation at the request of Dr. Grady for right hip pain. Patient reports pain began in January 2021. Patient states she was doing core exercises including lots of twisting when she noticed pain in her right hip. Pain is located in right lateral hip. Pain increases with sleeping on right side. Pain improves with activity avoidance / switching positions. Pain at worst is 4/10 and currently is 2/10.    She denies any trauma to the lateral aspect of the hip.    Present symptoms: numbness in bilateral feet (occurs very rarely)  Treatments tried to this point: rest / activity avoidance, xray 6/12/2015  Orthopedic PMH: left leg neuropathy    Past Medical History:   Diagnosis Date     Atypical nevus 8/16     Diffuse cystic mastopathy      Ebstein's anomaly      Palpitations      Spider veins      Thoracic or lumbosacral neuritis or radiculitis, unspecified     L leg neuropathy     Tubular adenoma 1/16       Past Surgical History:   Procedure Laterality Date     COLONOSCOPY N/A 1/29/2016    Procedure: COMBINED COLONOSCOPY, SINGLE OR MULTIPLE BIOPSY/POLYPECTOMY BY BIOPSY;  Surgeon: Margarette Millan MD;  Location: [facility]     HC BIOPSY OF BREAST, OPEN INCISIONAL       HC REMOVAL OF TONSILS,<11 Y/O      Tonsils <14y.o.     PELVIS LAPAROSCOPY,DX         Family History   Problem Relation Age of Onset     Cancer Father         brain tumor     C.A.D. Father         MI@70     Alzheimer Disease Mother      Arthritis Mother         RA     Hypertension Mother      Cancer - colorectal Sister 62       Social History     Socioeconomic History     Marital status:      Spouse name: Not on file     Number of children: Not on file     Years of education: Not on file     Highest education level: Not on file   Occupational History     Not on file   Tobacco Use     Smoking status: Never Smoker     Smokeless tobacco: Never Used   Substance and  Sexual Activity     Alcohol use: Yes     Comment: occasionally     Drug use: No     Sexual activity: Yes     Partners: Male   Other Topics Concern     Parent/sibling w/ CABG, MI or angioplasty before 65F 55M? Not Asked   Social History Narrative     Not on file     Social Determinants of Health     Financial Resource Strain:      Difficulty of Paying Living Expenses:    Food Insecurity:      Worried About Running Out of Food in the Last Year:      Ran Out of Food in the Last Year:    Transportation Needs:      Lack of Transportation (Medical):      Lack of Transportation (Non-Medical):    Physical Activity:      Days of Exercise per Week:      Minutes of Exercise per Session:    Stress:      Feeling of Stress :    Social Connections:      Frequency of Communication with Friends and Family:      Frequency of Social Gatherings with Friends and Family:      Attends Taoist Services:      Active Member of Clubs or Organizations:      Attends Club or Organization Meetings:      Marital Status:    Intimate Partner Violence:      Fear of Current or Ex-Partner:      Emotionally Abused:      Physically Abused:      Sexually Abused:        Current Outpatient Medications   Medication Sig Dispense Refill     amoxicillin (AMOXIL) 500 MG capsule Take 500 mg by mouth as needed       calcium carbonate (OS-RIKA) 500 MG tablet Take 1 tablet by mouth 2 times daily       multivitamin w/minerals (MULTI-VITAMIN) tablet Take 1 tablet by mouth daily       SUMAtriptan (IMITREX) 50 MG tablet Take 1 tablet (50 mg) by mouth at onset of headache for migraine May repeat dose in 2 hours.  Do not exceed 200 mg in 24 hours 18 tablet 11     Zinc 25 MG TABS          Allergies   Allergen Reactions     Fentanyl Nausea and Vomiting       REVIEW OF SYSTEMS:  CONSTITUTIONAL:  NEGATIVE for fever, chills, change in weight  INTEGUMENTARY/SKIN:  NEGATIVE for worrisome rashes, moles or lesions  EYES:  NEGATIVE for vision changes or irritation  ENT/MOUTH:   "NEGATIVE for ear, mouth and throat problems  RESP:  NEGATIVE for significant cough or SOB  BREAST:  NEGATIVE for masses, tenderness or discharge  CV:  NEGATIVE for chest pain, palpitations or peripheral edema  GI:  NEGATIVE for nausea, abdominal pain, heartburn, or change in bowel habits  :  Negative   MUSCULOSKELETAL:  See HPI above  NEURO:  NEGATIVE for weakness, dizziness or paresthesias  ENDOCRINE:  NEGATIVE for temperature intolerance, skin/hair changes  HEME/ALLERGY/IMMUNE:  NEGATIVE for bleeding problems  PSYCHIATRIC:  NEGATIVE for changes in mood or affect      PHYSICAL EXAM:  /82   Ht 1.603 m (5' 3.1\")   Wt 63.5 kg (140 lb)   LMP 08/18/2004   BMI 24.72 kg/m    Body mass index is 24.72 kg/m .   GENERAL APPEARANCE: healthy, alert and no distress   HEENT: No apparent thyroid megaly. Clear sclera with normal ocular movement  RESPIRATORY: No labored breathing  SKIN: no suspicious lesions or rashes  NEURO: Normal strength and tone, mentation intact and speech normal  VASCULAR: Good pulses, and capillary refill   LYMPH: no lymphadenopathy   PSYCH:  mentation appears normal and affect normal/bright    MUSCULOSKELETAL:  Not in acute distress  Normal gait  No difficulty getting up from sitting  Full range of motion of hips without pain especially with internal and external rotation movement  Straight leg raising is negative  Femoral stretch test is negative  Gross motor function is full  No swelling of the lower extremity  Skin is intact  Sensation is intact    Tenderness at the greater trochanteric region  Right hip abduction strength is well-maintained      ASSESSMENT:  Chronic relatively mild greater trochanteric hip bursitis, right    PLAN:  X-rays of the right hip from today were visualized.  No significant pathology was noted.  Compared to the x-rays from 2015, the joint spaces in both hips have been well-maintained.  There is no evidence of osteoporosis with satisfactory thickness of the diaphyseal " cortical bone thickness.    We then had a long discussion about the bursitis of the hip.  Informational materials provided.    We recommend stretching exercises.  Given her pain level, at this point, cortisone injection was not felt to be necessary however if her pain gets worse, will revisit that option.    She expressed her understanding of our discussion.  Follow-up as needed..    Imaging Interpretation:   Richie Valdes MD  Department of Orthopedic Surgery        Disclaimer: This note consists of symbols derived from keyboarding, dictation and/or voice recognition software. As a result, there may be errors in the script that have gone undetected. Please consider this when interpreting information found in this chart.

## 2021-08-25 ENCOUNTER — TRANSFERRED RECORDS (OUTPATIENT)
Dept: HEALTH INFORMATION MANAGEMENT | Facility: CLINIC | Age: 71
End: 2021-08-25

## 2021-08-26 ENCOUNTER — TRANSFERRED RECORDS (OUTPATIENT)
Dept: HEALTH INFORMATION MANAGEMENT | Facility: CLINIC | Age: 71
End: 2021-08-26

## 2021-09-08 ENCOUNTER — TRANSFERRED RECORDS (OUTPATIENT)
Dept: HEALTH INFORMATION MANAGEMENT | Facility: CLINIC | Age: 71
End: 2021-09-08

## 2021-10-24 ENCOUNTER — HEALTH MAINTENANCE LETTER (OUTPATIENT)
Age: 71
End: 2021-10-24

## 2021-12-28 ENCOUNTER — MYC MEDICAL ADVICE (OUTPATIENT)
Dept: INTERNAL MEDICINE | Facility: CLINIC | Age: 71
End: 2021-12-28
Payer: COMMERCIAL

## 2022-05-16 ENCOUNTER — MYC MEDICAL ADVICE (OUTPATIENT)
Dept: INTERNAL MEDICINE | Facility: CLINIC | Age: 72
End: 2022-05-16
Payer: COMMERCIAL

## 2022-06-05 ENCOUNTER — HEALTH MAINTENANCE LETTER (OUTPATIENT)
Age: 72
End: 2022-06-05

## 2022-08-19 ENCOUNTER — OFFICE VISIT (OUTPATIENT)
Dept: INTERNAL MEDICINE | Facility: CLINIC | Age: 72
End: 2022-08-19
Payer: COMMERCIAL

## 2022-08-19 DIAGNOSIS — Z13.1 SCREENING FOR DIABETES MELLITUS: ICD-10-CM

## 2022-08-19 DIAGNOSIS — Z83.49 FAMILY HISTORY OF THYROIDITIS: ICD-10-CM

## 2022-08-19 DIAGNOSIS — Z13.6 CARDIOVASCULAR SCREENING; LDL GOAL LESS THAN 130: ICD-10-CM

## 2022-08-19 DIAGNOSIS — Z00.00 ENCOUNTER FOR ANNUAL WELLNESS EXAM IN MEDICARE PATIENT: Primary | ICD-10-CM

## 2022-08-19 DIAGNOSIS — Z78.0 ASYMPTOMATIC POSTMENOPAUSAL STATUS: ICD-10-CM

## 2022-08-19 DIAGNOSIS — G43.009 NONINTRACTABLE MIGRAINE, UNSPECIFIED MIGRAINE TYPE: ICD-10-CM

## 2022-08-19 DIAGNOSIS — R03.0 ELEVATED BLOOD PRESSURE READING WITHOUT DIAGNOSIS OF HYPERTENSION: ICD-10-CM

## 2022-08-19 PROCEDURE — G0439 PPPS, SUBSEQ VISIT: HCPCS | Performed by: INTERNAL MEDICINE

## 2022-08-19 PROCEDURE — 80048 BASIC METABOLIC PNL TOTAL CA: CPT | Performed by: INTERNAL MEDICINE

## 2022-08-19 PROCEDURE — 99213 OFFICE O/P EST LOW 20 MIN: CPT | Mod: 25 | Performed by: INTERNAL MEDICINE

## 2022-08-19 PROCEDURE — 84443 ASSAY THYROID STIM HORMONE: CPT | Performed by: INTERNAL MEDICINE

## 2022-08-19 PROCEDURE — 36415 COLL VENOUS BLD VENIPUNCTURE: CPT | Performed by: INTERNAL MEDICINE

## 2022-08-19 PROCEDURE — 80061 LIPID PANEL: CPT | Performed by: INTERNAL MEDICINE

## 2022-08-19 RX ORDER — BIOTIN 10000 MCG
CAPSULE ORAL
COMMUNITY
End: 2024-03-14

## 2022-08-19 RX ORDER — SUMATRIPTAN 50 MG/1
50 TABLET, FILM COATED ORAL
Qty: 18 TABLET | Refills: 11 | Status: SHIPPED | OUTPATIENT
Start: 2022-08-19 | End: 2023-09-21

## 2022-08-19 ASSESSMENT — ENCOUNTER SYMPTOMS
HEMATOCHEZIA: 0
CONSTIPATION: 0
DYSURIA: 0
BREAST MASS: 0
HEADACHES: 0
WEAKNESS: 0
NAUSEA: 0
CHILLS: 0
HEMATURIA: 0
PALPITATIONS: 0
FEVER: 0
PARESTHESIAS: 0
NERVOUS/ANXIOUS: 0
COUGH: 0
ARTHRALGIAS: 0
ABDOMINAL PAIN: 0
DIZZINESS: 0
JOINT SWELLING: 0
DIARRHEA: 0
SORE THROAT: 0
HEARTBURN: 0
EYE PAIN: 0
SHORTNESS OF BREATH: 0
MYALGIAS: 0
FREQUENCY: 1

## 2022-08-19 ASSESSMENT — ACTIVITIES OF DAILY LIVING (ADL): CURRENT_FUNCTION: NO ASSISTANCE NEEDED

## 2022-08-19 NOTE — PROGRESS NOTES
"SUBJECTIVE:   Vilma Hughes is a 71 year old female who presents for Preventive Visit.      Patient has been advised of split billing requirements and indicates understanding: Yes  Are you in the first 12 months of your Medicare coverage?  No    Healthy Habits:     In general, how would you rate your overall health?  Excellent    Frequency of exercise:  4-5 days/week    Duration of exercise:  45-60 minutes    Do you usually eat at least 4 servings of fruit and vegetables a day, include whole grains    & fiber and avoid regularly eating high fat or \"junk\" foods?  No    Taking medications regularly:  Yes    Medication side effects:  Not applicable    Ability to successfully perform activities of daily living:  No assistance needed    Home Safety:  No safety concerns identified    Hearing Impairment:  Feel that people are mumbling or not speaking clearly    In the past 6 months, have you been bothered by leaking of urine?  No    In general, how would you rate your overall mental or emotional health?  Excellent      PHQ-2 Total Score: 0    Additional concerns today:  Yes    She has questions regarding thyroid and parathyroid issues.  Her son was recently diagnosed with hyperparathyroidism, also thyroid problems and there have been other family members with thyroid problems so she would like to have that checked.  She has had some slight gradual hair loss, dry skin but not clear that these are suddenly change.  She is not having constipation, weight loss.    She reports migraine headaches are infrequent.    Her blood pressure was elevated today, she reports it was checked at the dentist a few months ago and was 126 over 70s.  No headaches or other symptoms associated.    Patient Active Problem List   Diagnosis     Diffuse cystic mastopathy     Ebstein's anomaly     Advanced directives, counseling/discussion     Nonintractable migraine, unspecified migraine type     CARDIOVASCULAR SCREENING; LDL GOAL LESS THAN 130 "     Current Outpatient Medications   Medication Sig Dispense Refill     amoxicillin (AMOXIL) 500 MG capsule Take 500 mg by mouth as needed       Biotin 10 MG CAPS        calcium carbonate (OS-RIKA) 500 MG tablet Take 1 tablet by mouth daily       multivitamin w/minerals (THERA-VIT-M) tablet Take 1 tablet by mouth daily       SUMAtriptan (IMITREX) 50 MG tablet Take 1 tablet (50 mg) by mouth at onset of headache for migraine May repeat dose in 2 hours. 18 tablet 11     Zinc 25 MG TABS           Do you feel safe in your environment? Yes    Have you ever done Advance Care Planning? (For example, a Health Directive, POLST, or a discussion with a medical provider or your loved ones about your wishes): No, advance care planning information given to patient to review.  Patient plans to discuss their wishes with loved ones or provider.         Fall risk  Fallen 2 or more times in the past year?: No  Any fall with injury in the past year?: No    Cognitive Screening   1) Repeat 3 items (Leader, Season, Table)    2) Clock draw: NORMAL  3) 3 item recall: Recalls 2 objects   Results: NORMAL clock, 1-2 items recalled: COGNITIVE IMPAIRMENT LESS LIKELY    Mini-CogTM Copyright S Derek. Licensed by the author for use in [facility]; reprinted with permission (tariq@[facility]). All rights reserved.      Do you have sleep apnea, excessive snoring or daytime drowsiness?: no    Reviewed and updated as needed this visit by clinical staff    Reviewed and updated as needed this visit by Provider     Social History     Tobacco Use     Smoking status: Never Smoker     Smokeless tobacco: Never Used   Substance Use Topics     Alcohol use: Yes     Comment: occasionally     If you drink alcohol do you typically have >3 drinks per day or >7 drinks per week? No    Alcohol Use 8/19/2022   Prescreen: >3 drinks/day or >7 drinks/week? No   Prescreen: >3 drinks/day or >7 drinks/week? -   No flowsheet data found.            Current providers  sharing in care for this patient include:   Patient Care Team:  Vilma Grady MD as PCP - General (Internal Medicine)  Vilma Grady MD as Assigned PCP  Zac Valdes MD as Assigned Musculoskeletal Provider    The following health maintenance items are reviewed in Epic and correct as of today:  Health Maintenance Due   Topic Date Due     MEDICARE ANNUAL WELLNESS VISIT  04/22/2022     ANNUAL REVIEW OF HM ORDERS  04/28/2022           Breast CA Risk Assessment (FHS-7) 8/19/2022   Do you have a family history of breast, colon, or ovarian cancer? No / Unknown       click delete button to remove this line now  Mammogram Screening: Recommended mammography every 1-2 years with patient discussion and risk factor consideration  Pertinent mammograms are reviewed under the imaging tab.    Review of Systems   Constitutional: Negative for chills and fever.   HENT: Positive for hearing loss. Negative for congestion, ear pain and sore throat.    Eyes: Negative for pain and visual disturbance.   Respiratory: Negative for cough and shortness of breath.    Cardiovascular: Negative for chest pain, palpitations and peripheral edema.   Gastrointestinal: Negative for abdominal pain, constipation, diarrhea, heartburn, hematochezia and nausea.   Breasts:  Negative for tenderness, breast mass and discharge.   Genitourinary: Positive for frequency and urgency. Negative for dysuria, genital sores, hematuria, pelvic pain, vaginal bleeding and vaginal discharge.   Musculoskeletal: Negative for arthralgias, joint swelling and myalgias.   Skin: Negative for rash.   Neurological: Negative for dizziness, weakness, headaches and paresthesias.   Psychiatric/Behavioral: Negative for mood changes. The patient is not nervous/anxious.      Hearing loss is not really bothering her a lot, it is not rapid, both sides equally.  Does not feel she needs to consider having aids yet.  Urinary symptoms are stable.    OBJECTIVE:   BP (!) 148/80   Pulse 71    "Temp 97.6  F (36.4  C) (Oral)   Resp 16   Ht 1.613 m (5' 3.5\")   Wt 61.6 kg (135 lb 14.4 oz)   LMP 08/18/2004   SpO2 99%   BMI 23.70 kg/m   Estimated body mass index is 24.72 kg/m  as calculated from the following:    Height as of 7/29/21: 1.603 m (5' 3.1\").    Weight as of 7/29/21: 63.5 kg (140 lb).  Physical Exam    HEENT: extraocular movements are intact, pupils equal and reactive to light and accommodation, TMs clear  NECK: Neck supple. No adenopathy. Thyroid symmetric, normal size,, Carotids without bruits.  PULMONARY: clear to auscultation  CARDIAC: regular rate and rhythm and no murmurs, clicks, or gallops  PULSES: 2/2 throughout  BACK: no spinal or CVAT  ABDOMINAL: Soft, nontender.  Normal bowel sounds.  No hepatosplenomegaly or abnormal masses  BREAST: No breast masses or tenderness, No axillary masses or tenderness and No galactorrhea  REFLEXES: 2+ throughout      ASSESSMENT / PLAN:   (Z00.00) Encounter for annual wellness exam in Medicare patient  (primary encounter diagnosis)  Comment: Up-to-date  Plan:     (G43.009) Nonintractable migraine, unspecified migraine type  Comment: Stable, less frequent, update medication  Plan: SUMAtriptan (IMITREX) 50 MG tablet            (R03.0) Elevated blood pressure reading without diagnosis of hypertension  Comment: Elevated readings, usually blood pressures been good when she is here but it could be a whitecoat issue.  Plan: Recommend she do some outside checks, watch her salt intake.  If she has consistent readings greater than 140/90 over few months she should call    (Z13.1) Screening for diabetes mellitus  Comment:   Plan: Basic metabolic panel  (Ca, Cl, CO2, Creat,         Gluc, K, Na, BUN)            (Z13.6) CARDIOVASCULAR SCREENING; LDL GOAL LESS THAN 130  Comment:   Plan: Lipid panel reflex to direct LDL Fasting            (Z83.49) Family history of thyroiditis  Comment:   Plan: TSH with free T4 reflex            (Z78.0) Asymptomatic postmenopausal " "status  Comment:   Plan: DX Hip/Pelvis/Spine              Patient has been advised of split billing requirements and indicates understanding: Yes    COUNSELING:  Reviewed preventive health counseling, as reflected in patient instructions    Estimated body mass index is 24.72 kg/m  as calculated from the following:    Height as of 7/29/21: 1.603 m (5' 3.1\").    Weight as of 7/29/21: 63.5 kg (140 lb).        She reports that she has never smoked. She has never used smokeless tobacco.      Appropriate preventive services were discussed with this patient, including applicable screening as appropriate for cardiovascular disease, diabetes, osteopenia/osteoporosis, and glaucoma.  As appropriate for age/gender, discussed screening for colorectal cancer, prostate cancer, breast cancer, and cervical cancer. Checklist reviewing preventive services available has been given to the patient.    Reviewed patients plan of care and provided an AVS. The Basic Care Plan (routine screening as documented in Health Maintenance) for Vilma meets the Care Plan requirement. This Care Plan has been established and reviewed with the Patient.    Counseling Resources:  ATP IV Guidelines  Pooled Cohorts Equation Calculator  Breast Cancer Risk Calculator  Breast Cancer: Medication to Reduce Risk  FRAX Risk Assessment  ICSI Preventive Guidelines  Dietary Guidelines for Americans, 2010  [employer]'s MyPlate  ASA Prophylaxis  Lung CA Screening    Vilma Grady MD  [facility]    Identified Health Risks:  "

## 2022-08-19 NOTE — NURSING NOTE
"BP (!) 156/78 (BP Location: Left arm, Patient Position: Sitting, Cuff Size: Adult Regular)   Pulse 71   Temp 97.6  F (36.4  C) (Oral)   Resp 16   Ht 1.613 m (5' 3.5\")   Wt 61.6 kg (135 lb 14.4 oz)   LMP 08/18/2004   SpO2 99%   BMI 23.70 kg/m      "

## 2022-08-20 LAB
ANION GAP SERPL CALCULATED.3IONS-SCNC: 4 MMOL/L (ref 3–14)
BUN SERPL-MCNC: 16 MG/DL (ref 7–30)
CALCIUM SERPL-MCNC: 9.2 MG/DL (ref 8.5–10.1)
CHLORIDE BLD-SCNC: 111 MMOL/L (ref 94–109)
CHOLEST SERPL-MCNC: 208 MG/DL
CO2 SERPL-SCNC: 25 MMOL/L (ref 20–32)
CREAT SERPL-MCNC: 0.72 MG/DL (ref 0.52–1.04)
FASTING STATUS PATIENT QL REPORTED: YES
GFR SERPL CREATININE-BSD FRML MDRD: 90 ML/MIN/1.73M2
GLUCOSE BLD-MCNC: 107 MG/DL (ref 70–99)
HDLC SERPL-MCNC: 77 MG/DL
LDLC SERPL CALC-MCNC: 118 MG/DL
NONHDLC SERPL-MCNC: 131 MG/DL
POTASSIUM BLD-SCNC: 4.4 MMOL/L (ref 3.4–5.3)
SODIUM SERPL-SCNC: 140 MMOL/L (ref 133–144)
TRIGL SERPL-MCNC: 63 MG/DL
TSH SERPL DL<=0.005 MIU/L-ACNC: 2.01 MU/L (ref 0.4–4)

## 2022-08-27 VITALS
DIASTOLIC BLOOD PRESSURE: 80 MMHG | BODY MASS INDEX: 23.2 KG/M2 | HEART RATE: 71 BPM | SYSTOLIC BLOOD PRESSURE: 148 MMHG | TEMPERATURE: 97.6 F | HEIGHT: 64 IN | RESPIRATION RATE: 16 BRPM | WEIGHT: 135.9 LBS | OXYGEN SATURATION: 99 %

## 2022-08-28 ENCOUNTER — OFFICE VISIT (OUTPATIENT)
Dept: URGENT CARE | Facility: URGENT CARE | Age: 72
End: 2022-08-28
Payer: COMMERCIAL

## 2022-08-28 VITALS
SYSTOLIC BLOOD PRESSURE: 120 MMHG | DIASTOLIC BLOOD PRESSURE: 60 MMHG | TEMPERATURE: 96.6 F | HEART RATE: 69 BPM | OXYGEN SATURATION: 97 %

## 2022-08-28 DIAGNOSIS — H61.21 IMPACTED CERUMEN OF RIGHT EAR: Primary | ICD-10-CM

## 2022-08-28 PROCEDURE — 69209 REMOVE IMPACTED EAR WAX UNI: CPT | Mod: RT | Performed by: FAMILY MEDICINE

## 2022-08-28 NOTE — PROGRESS NOTES
SUBJECTIVE:   Vilma Hughes is a 71 year old female presenting with a chief complaint of pain, decreased hearing out of the right ear.  Patient's right ear was examined during a recent physical exam and cerumen impaction was found.  The primary care provider had difficulty removing the ear wax during the August 19, 2022 physical exam.  Patient was told to use Debrox ; however, the ear pain started to appear after using the Debrox..    .    Past Medical History:   Diagnosis Date     Atypical nevus 8/16     Diffuse cystic mastopathy      Ebstein's anomaly      Palpitations      Spider veins      Thoracic or lumbosacral neuritis or radiculitis, unspecified     L leg neuropathy     Tubular adenoma 1/16     Current Outpatient Medications   Medication Sig Dispense Refill     Biotin 10 MG CAPS        calcium carbonate (OS-RIKA) 500 MG tablet Take 1 tablet by mouth daily       multivitamin w/minerals (THERA-VIT-M) tablet Take 1 tablet by mouth daily       SUMAtriptan (IMITREX) 50 MG tablet Take 1 tablet (50 mg) by mouth at onset of headache for migraine May repeat dose in 2 hours. 18 tablet 11     Zinc 25 MG TABS        Social History     Tobacco Use     Smoking status: Never Smoker     Smokeless tobacco: Never Used   Substance Use Topics     Alcohol use: Yes     Comment: occasionally       ROS:  CONSTITUTIONAL:negative for fevers.    ENT/MOUTH:  Positive for pain, decreased hearing at the right ear      OBJECTIVE:  /60   Pulse 69   Temp (!) 96.6  F (35.9  C) (Tympanic)   LMP 08/18/2004   SpO2 97%   GENERAL APPEARANCE: healthy, alert and no distress  HENT: cerumen impaction is seen in the right ear.  After the right ear irrigation was perfmed, a secondary examination of the right ear revealed no more cerumen with a normal TM and normal canal.  .      ASSESSMENT:  Cerumen Impaction of the right ear.      PLAN:  The medical assistant irrigated the right ear during this clinic encounter.  All of the cerumen was  removed successfully.  Patient reported no more pain and reported improved hearing.      follow up PRN if not better. .      Eric Quarles MD

## 2022-10-15 ENCOUNTER — HEALTH MAINTENANCE LETTER (OUTPATIENT)
Age: 72
End: 2022-10-15

## 2022-11-09 ENCOUNTER — ANCILLARY PROCEDURE (OUTPATIENT)
Dept: BONE DENSITY | Facility: CLINIC | Age: 72
End: 2022-11-09
Attending: INTERNAL MEDICINE
Payer: COMMERCIAL

## 2022-11-09 DIAGNOSIS — Z78.0 ASYMPTOMATIC POSTMENOPAUSAL STATUS: ICD-10-CM

## 2022-11-09 PROCEDURE — 77080 DXA BONE DENSITY AXIAL: CPT | Performed by: INTERNAL MEDICINE

## 2023-07-20 ENCOUNTER — PATIENT OUTREACH (OUTPATIENT)
Dept: CARE COORDINATION | Facility: CLINIC | Age: 73
End: 2023-07-20
Payer: COMMERCIAL

## 2023-08-03 ENCOUNTER — PATIENT OUTREACH (OUTPATIENT)
Dept: CARE COORDINATION | Facility: CLINIC | Age: 73
End: 2023-08-03
Payer: COMMERCIAL

## 2023-08-03 ENCOUNTER — TELEPHONE (OUTPATIENT)
Dept: INTERNAL MEDICINE | Facility: CLINIC | Age: 73
End: 2023-08-03
Payer: COMMERCIAL

## 2023-08-03 NOTE — TELEPHONE ENCOUNTER
Reason for Call:  Appointment Request    Patient requesting this type of appt:  Preventive     Requested provider: Vilma Grady    Reason patient unable to be scheduled: Not with their preferred provider    When does patient want to be seen/preferred time:  after 8/20    Comments: Patient would like to get in to see Dr. Grady for her annual PHE but no openings available before she retires. She is wondering if she can be worked in. At her PHE she would like a referral to audiology for her hearing. Patient also mentioned she has had slurred speech for the last 6 months and is wondering if she possibly had a small stroke. Declined to speak to FNA. Would just like to get in to see Dr. Grady if possible.    Could we send this information to you in [facility] or would you prefer to receive a phone call?:   Patient would prefer a phone call   Okay to leave a detailed message?: Yes at Home number on file [phone] (home)    Call taken on 8/3/2023 at 3:49 PM by Natalie Ambrocio

## 2023-09-21 ENCOUNTER — OFFICE VISIT (OUTPATIENT)
Dept: INTERNAL MEDICINE | Facility: CLINIC | Age: 73
End: 2023-09-21
Payer: COMMERCIAL

## 2023-09-21 VITALS
RESPIRATION RATE: 16 BRPM | SYSTOLIC BLOOD PRESSURE: 140 MMHG | HEART RATE: 76 BPM | DIASTOLIC BLOOD PRESSURE: 82 MMHG | WEIGHT: 138 LBS | TEMPERATURE: 97.6 F | HEIGHT: 63 IN | OXYGEN SATURATION: 100 % | BODY MASS INDEX: 24.45 KG/M2

## 2023-09-21 DIAGNOSIS — Z13.220 SCREENING FOR HYPERLIPIDEMIA: ICD-10-CM

## 2023-09-21 DIAGNOSIS — R47.81 SLURRED SPEECH: ICD-10-CM

## 2023-09-21 DIAGNOSIS — Z13.29 SCREENING FOR THYROID DISORDER: ICD-10-CM

## 2023-09-21 DIAGNOSIS — Z13.0 SCREENING FOR IRON DEFICIENCY ANEMIA: ICD-10-CM

## 2023-09-21 DIAGNOSIS — Z01.10 ENCOUNTER FOR HEARING EXAMINATION WITHOUT ABNORMAL FINDINGS: ICD-10-CM

## 2023-09-21 DIAGNOSIS — R73.09 ELEVATED GLUCOSE: ICD-10-CM

## 2023-09-21 DIAGNOSIS — G43.009 NONINTRACTABLE MIGRAINE, UNSPECIFIED MIGRAINE TYPE: Primary | ICD-10-CM

## 2023-09-21 DIAGNOSIS — Q22.5 EBSTEIN'S ANOMALY: ICD-10-CM

## 2023-09-21 LAB
ANION GAP SERPL CALCULATED.3IONS-SCNC: 12 MMOL/L (ref 7–15)
BUN SERPL-MCNC: 14.9 MG/DL (ref 8–23)
CALCIUM SERPL-MCNC: 9.7 MG/DL (ref 8.8–10.2)
CHLORIDE SERPL-SCNC: 106 MMOL/L (ref 98–107)
CHOLEST SERPL-MCNC: 220 MG/DL
CREAT SERPL-MCNC: 0.84 MG/DL (ref 0.51–0.95)
DEPRECATED HCO3 PLAS-SCNC: 23 MMOL/L (ref 22–29)
EGFRCR SERPLBLD CKD-EPI 2021: 74 ML/MIN/1.73M2
ERYTHROCYTE [DISTWIDTH] IN BLOOD BY AUTOMATED COUNT: 12.7 % (ref 10–15)
GLUCOSE SERPL-MCNC: 115 MG/DL (ref 70–99)
HBA1C MFR BLD: 5.6 % (ref 0–5.6)
HCT VFR BLD AUTO: 44.3 % (ref 35–47)
HDLC SERPL-MCNC: 74 MG/DL
HGB BLD-MCNC: 14.6 G/DL (ref 11.7–15.7)
LDLC SERPL CALC-MCNC: 131 MG/DL
MCH RBC QN AUTO: 30.9 PG (ref 26.5–33)
MCHC RBC AUTO-ENTMCNC: 33 G/DL (ref 31.5–36.5)
MCV RBC AUTO: 94 FL (ref 78–100)
NONHDLC SERPL-MCNC: 146 MG/DL
PLATELET # BLD AUTO: 245 10E3/UL (ref 150–450)
POTASSIUM SERPL-SCNC: 5.3 MMOL/L (ref 3.4–5.3)
RBC # BLD AUTO: 4.72 10E6/UL (ref 3.8–5.2)
SODIUM SERPL-SCNC: 141 MMOL/L (ref 136–145)
TRIGL SERPL-MCNC: 76 MG/DL
TSH SERPL DL<=0.005 MIU/L-ACNC: 2.47 UIU/ML (ref 0.3–4.2)
WBC # BLD AUTO: 4.7 10E3/UL (ref 4–11)

## 2023-09-21 PROCEDURE — 84443 ASSAY THYROID STIM HORMONE: CPT

## 2023-09-21 PROCEDURE — 80048 BASIC METABOLIC PNL TOTAL CA: CPT

## 2023-09-21 PROCEDURE — 36415 COLL VENOUS BLD VENIPUNCTURE: CPT

## 2023-09-21 PROCEDURE — 80061 LIPID PANEL: CPT

## 2023-09-21 PROCEDURE — 83036 HEMOGLOBIN GLYCOSYLATED A1C: CPT

## 2023-09-21 PROCEDURE — 85027 COMPLETE CBC AUTOMATED: CPT

## 2023-09-21 PROCEDURE — 99214 OFFICE O/P EST MOD 30 MIN: CPT

## 2023-09-21 RX ORDER — SUMATRIPTAN 50 MG/1
50 TABLET, FILM COATED ORAL
Qty: 18 TABLET | Refills: 11 | Status: SHIPPED | OUTPATIENT
Start: 2023-09-21 | End: 2024-05-16

## 2023-09-21 NOTE — PROGRESS NOTES
Assessment & Plan     (G43.009) Nonintractable migraine, unspecified migraine type  (primary encounter diagnosis)  Comment: Stable with use of Imitrex PRN.  Plan: SUMAtriptan (IMITREX) 50 MG tablet            (Z01.10) Encounter for hearing examination without abnormal findings  Comment:   Plan: Adult Audiology  Referral            (R47.81) Slurred speech  Comment:   Over the past 6-8 months she endorses slurred speech, drooling, occasional difficulty swallowing.   Moderate difficulty with word finding. She is wondering if she had a mini stroke in the past.    No history of A-fib, HTN, or HLD. Non-smoker. Her risk factors for stroke are relatively low. Only cardiac history is Ebstein's anomaly. BP today is borderline elevated at 140/82. She may need SLP consult- I would like her to see neurology first for initial evaluation.      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 At that time they recommended she consider surgical repair of tricuspid valve but she was not interested in this. ECHO at that time showed right ventricular enlargement but systolic function was preserved. She has not followed up with cardiology since. I do not appreciate any murmur today     -She is agreeable to update ECHO and I can send referral to Cardiology after if necessary.    Plan: Echocardiogram Complete                 Jyothi White, ROQUE CNP  [facility]  "SANDHYA Esparza is a 72 year old, presenting for the following health issues:  [facility]        9/21/2023     7:30 AM   Additional Questions   Roomed by Staci       History of Present Illness       Reason for visit:  Wellness and slurred speech and hearing issues  Symptom onset:  More than a month  Symptoms include:  Slurred speech, tongue issye when eating,gearing issues  Symptom intensity:  Moderate  Symptom progression:  Staying the same  Had these symptoms before:  No  What makes it worse:  No  What makes it better:  Yes    She eats 0-1 servings of fruits and vegetables daily.She consumes 0 sweetened beverage(s) daily.She exercises with enough effort to increase her heart rate 20 to 29 minutes per day.  She exercises with enough effort to increase her heart rate 4 days per week.   She is taking medications regularly.           Objective    BP (!) 140/82   Pulse 76   Temp 97.6  F (36.4  C) (Oral)   Resp 16   Ht 1.6 m (5' 3\")   Wt 62.6 kg (138 lb)   LMP 08/18/2004   SpO2 100%   BMI 24.45 kg/m    Body mass index is 24.45 kg/m .        Physical Exam  Constitutional:       General: She is not in acute distress.     Appearance: Normal appearance. She is not ill-appearing, toxic-appearing or diaphoretic.   HENT:      Head: Normocephalic and atraumatic.   Eyes:      Conjunctiva/sclera: Conjunctivae normal.   Cardiovascular:      Rate and Rhythm: Normal rate and regular rhythm.      Heart sounds: Normal heart sounds.   Pulmonary:      Effort: Pulmonary effort is normal.      Breath sounds: Normal breath sounds.   Skin:     General: Skin is warm and dry.   Neurological:      Mental Status: She is alert and oriented to person, place, and time.   Psychiatric:         Mood and Affect: Mood normal.         Behavior: Behavior normal.         Thought Content: Thought content normal.         Judgment: Judgment normal.                   "

## 2023-09-27 NOTE — TELEPHONE ENCOUNTER
Ensure adequate caloric intake, high protein-can offer consult with dietician as well. If continued weight loss needs to come in for evaluation. Clinically important weight loss is generally defined as loss of more than 5 percent of usual body weight over 6 to 12 months.   Left message to call back on home and mobile number.    Please schedule pt for fasting lab appointment. Labs drawn today were hemolyze and cannot be use. Order's placed as future.     Thank you

## 2023-10-02 ENCOUNTER — MYC MEDICAL ADVICE (OUTPATIENT)
Dept: INTERNAL MEDICINE | Facility: CLINIC | Age: 73
End: 2023-10-02
Payer: COMMERCIAL

## 2023-10-02 NOTE — TELEPHONE ENCOUNTER
[employer] message sent to Patient.  Flu Shot entered, but Patient has not yet had her COVID shot and we will need copy of her white card or for the Pharmacy to fax us the information.

## 2023-10-04 ENCOUNTER — HOSPITAL ENCOUNTER (OUTPATIENT)
Dept: CARDIOLOGY | Facility: CLINIC | Age: 73
Discharge: HOME | End: 2023-10-04
Payer: COMMERCIAL

## 2023-10-04 DIAGNOSIS — Q22.5 EBSTEIN'S ANOMALY: ICD-10-CM

## 2023-10-04 LAB — LVEF ECHO: NORMAL

## 2023-10-04 PROCEDURE — 93306 TTE W/DOPPLER COMPLETE: CPT

## 2023-10-04 PROCEDURE — 93306 TTE W/DOPPLER COMPLETE: CPT | Mod: 26 | Performed by: INTERNAL MEDICINE

## 2023-10-06 DIAGNOSIS — I07.1 TRICUSPID VALVE INSUFFICIENCY, UNSPECIFIED ETIOLOGY: ICD-10-CM

## 2023-10-06 DIAGNOSIS — G43.009 NONINTRACTABLE MIGRAINE, UNSPECIFIED MIGRAINE TYPE: Primary | ICD-10-CM

## 2023-10-06 DIAGNOSIS — Q22.5 EBSTEIN'S ANOMALY: ICD-10-CM

## 2023-10-09 ENCOUNTER — TELEPHONE (OUTPATIENT)
Dept: CARDIOLOGY | Facility: CLINIC | Age: 73
End: 2023-10-09

## 2023-10-09 DIAGNOSIS — Q22.5 EBSTEIN'S ANOMALY: Primary | ICD-10-CM

## 2023-10-09 DIAGNOSIS — Q24.9 ADULT CONGENITAL HEART DISEASE: ICD-10-CM

## 2023-10-09 NOTE — TELEPHONE ENCOUNTER
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  Please have someone from Dr. Arreaga's team call pt to schedule at [facility]      Action Taken: Message routed to:  [facility] cardio    Travel Screening: Not Applicable    Thank you!  [facility]

## 2023-10-18 NOTE — TELEPHONE ENCOUNTER
Date: 10/18/2023    Time of Call: 6:52 AM     Diagnosis:  Ebsteins anomaly     [ TORB ] Ordering provider: Donnie Arreaga MD  Order: appt in [facility] with EKG     Order received by: stephane Perez RN     Follow-up/additional notes: sent to scheduling.     Referral from IM  (R47.81) Slurred speech  Comment:   Over the past 6-8 months she endorses slurred speech, drooling, occasional difficulty swallowing.   Moderate difficulty with word finding. She is wondering if she had a mini stroke in the past.     No history of A-fib, HTN, or HLD. Non-smoker. Her risk factors for stroke are relatively low. Only cardiac history is Ebstein's anomaly. BP today is borderline elevated at 140/82. She may need SLP consult- I would like her to see neurology first for initial evaluation.    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 At that time they recommended she consider surgical repair of tricuspid valve but she was not interested in this. ECHO at that time showed right ventricular enlargement but systolic function was preserved. She has not followed up with cardiology since. I do not appreciate any murmur today      -She is agreeable to update ECHO and I can send referral to Cardiology after if necessary.    Last visit with Dr Espino in 7/27/2012  HISTORY OF PRESENT ILLNESS     It is, as always, a pleasure to see Vilma whom I have been following for Ebstein anomaly for quite some time.  She has been assessed at the [facility] on several occasions.  Today she tells me that she in fact has been advised to have surgery.  She has not told me this before.  She finally had an MRI for assessment of her right ventricle.  Right ventricular systolic pressure is mildly depressed.  I reinforced my recommendation that it is time  for surgery and I think she should have it done at the [facility] which has more expertise than probably anywhere else in the world  for this type of corrective surgery.  Aside from fatigue, she has no other symptoms.  She denies shortness of breath or chest pain.  Cardiac examination reveals no evidence of congestive heart failure.    Past Cardiac Illnesses:    tricuspid valve regurgitation, Ebstein's Anomaly.     Cardiology Procedures-NonInvasive:    treadmill July 2006, echocardiogram Sep 2008, Aug 2011, stress echo Sep 2008, Cardiac MRI Jun 2012     PMHx Echo Results:    7/10/06=Mod.severe TR, mod/severe RVE with preserved function @63%     PMHx Stress Echo Results:    7/10/06 Treadmill: No ischemia, 9/08 normal stress echo     Left Ventricular Ejection Fraction:    EF<GT>55% by Echo -Sep 2008     EF<GT>55% by Echo -Sep 2008     IMPRESSION/PLAN:  As always, she is reluctant to have surgery despite our recommendations.  In this case I will keep her on six monthly appointments to follow her more closely.  I told her to give us a call and be seen sooner if she experiences any further deterioration in her stamina or if she experiences significant shortness of breath.

## 2023-11-04 ENCOUNTER — HEALTH MAINTENANCE LETTER (OUTPATIENT)
Age: 73
End: 2023-11-04

## 2023-11-30 ENCOUNTER — MYC MEDICAL ADVICE (OUTPATIENT)
Dept: INTERNAL MEDICINE | Facility: CLINIC | Age: 73
End: 2023-11-30
Payer: COMMERCIAL

## 2023-11-30 DIAGNOSIS — K13.70 ORAL LESION: Primary | ICD-10-CM

## 2023-12-01 ENCOUNTER — TRANSFERRED RECORDS (OUTPATIENT)
Dept: HEALTH INFORMATION MANAGEMENT | Facility: CLINIC | Age: 73
End: 2023-12-01
Payer: COMMERCIAL

## 2023-12-01 NOTE — TELEPHONE ENCOUNTER
[facility]   [phone]     Patient informed via [employer] of provider's message below and referral info.

## 2023-12-01 NOTE — TELEPHONE ENCOUNTER
Referral pended.    /Thank you,  Cruzito Streeter, Triage RN [facility] Lindsey  12:05 PM 12/1/2023

## 2023-12-08 ENCOUNTER — TRANSFERRED RECORDS (OUTPATIENT)
Dept: HEALTH INFORMATION MANAGEMENT | Facility: CLINIC | Age: 73
End: 2023-12-08
Payer: COMMERCIAL

## 2023-12-19 NOTE — TELEPHONE ENCOUNTER
FUTURE VISIT INFORMATION      FUTURE VISIT INFORMATION:  Date: 3/13/24  Time: 11:20 AM  Location: [facility] [place] [facility]  REFERRAL INFORMATION:  Referring provider:    Referring providers clinic:    Reason for visit/diagnosis:  Oral lesion, apt per Pt, [place] verified     RECORDS REQUESTED FROM      Clinic name Comments Records Status Imaging Status   [facility] Internal Medicine 11/30/23 mychart advice/ referral -Jyothi White, APRN CNP  Epic    [facility]  MR fernando 12/8/23 12/1/23 OV notes Scanned in Epic Pending req  Req 2/14/24  PACS           February 14, 2024 7:20 AM - image requested from [facility]  February 19, 2024 1:28 PM - No images pushed to , kept getting imaging report for 12/8/23 MR brain only. Called [facility] and spoke to Adam regarding image. Adam will push the image -Jodi

## 2024-01-01 NOTE — LETTER
"8/29/2018       RE: Vilma Hughes  [address]  [place] [address]     Dear Colleague,    Thank you for referring your patient, Vilma Hughes, to the [facility] [place] at [facility]. Please see a copy of my visit note below.    CC: Urinary urgency and frequency.    HPI: It is a pleasure to see Vilma Hughes, a pleasant 67 year old female seen in  for the above. This problem has been going on for 10 months. The patient voids q60 (improved) min during the day, nocturia x 0. There is mild urgency associated with symptoms of urge incontinence. The patient wears protective pads. No leakage with coughing, sneezing, bending at the waist.  Started on estrace cream three times a week last month and seeing improvement in her symptoms.     Denies any dysuria, gross hematuria, pyuria, sensation of incomplete bladder emptying, needing to strain or Crede to void, history of recent urinary tract infections or kidney stones. The patient does not have any neurologic issues. Denies constipation. Fluid consumption includes \"a lot of water\", coffee and soda.    Past Medical History:   Diagnosis Date     Atypical nevus 8/16     Diffuse cystic mastopathy      Ebstein's anomaly      Palpitations      Spider veins      Thoracic or lumbosacral neuritis or radiculitis, unspecified     L leg neuropathy     Tubular adenoma 1/16     Past Surgical History:   Procedure Laterality Date     COLONOSCOPY N/A 1/29/2016    Procedure: COMBINED COLONOSCOPY, SINGLE OR MULTIPLE BIOPSY/POLYPECTOMY BY BIOPSY;  Surgeon: Margarette Millan MD;  Location:  GI     HC BIOPSY OF BREAST, OPEN INCISIONAL       HC REMOVAL OF TONSILS,<11 Y/O      Tonsils <12y.o.     PELVIS LAPAROSCOPY,DX       Current Outpatient Prescriptions   Medication Sig Dispense Refill     estradiol (ESTRACE VAGINAL) 0.1 MG/GM cream Apply small amount to the vaginal opening and urethra M, W, F q. " "h.s. 42.5 g 3     estradiol (ESTRACE) 0.1 MG/GM cream Place 2 g vaginally twice a week (Patient not taking: Reported on 6/7/2018) 42.5 g 3     SUMAtriptan (IMITREX) 50 MG tablet Take 1 tablet (50 mg) by mouth at onset of headache for migraine May repeat dose in 2 hours.  Do not exceed 200 mg in 24 hours (Patient not taking: Reported on 7/25/2018) 18 tablet 11     Allergies   Allergen Reactions     Fentanyl Nausea and Vomiting     Family History: There is no h/o  malignancy.  There is no h/o urolithiasis.    Social History: The patient does not smoke cigarettes. Denies EtOH and illicit drug use.      ROS: A comprehensive 14 point ROS was obtained and otherwise negative except for that outlined above in the HPI.    PHYSICAL EXAM:   Vitals:    08/29/18 1301   Pulse: 76   SpO2: 98%   Weight: 65.8 kg (145 lb)   Height: 1.6 m (5' 3\")     GENERAL: Well groomed, well developed, well nourished female in NAD.  HEENT: EOMI, AT, NC.  SKIN: Warm to touch, dry.  RESP: No increased respiratory effort.   LYMPH: No LE edema.  ABD: Soft, NT, ND.  No CVAT bilaterally.  MS: Full ROM in extremities.  PELVIC:  (last visit)      Examined in dorsal lithotomy position with a speculum.      Normal external genitalia and introitus, atrophic changes.   NEURO: Alert and oriented x 3.  PSYCH: Normal mood and affect, pleasant and agreeable during interview and exam.    IMAGING: None    ASSESSMENT/PLAN:  Ms. Vilma Hughes is a 67 year old female with Urinary freq, improved.  - Eliminate bladder irritants  - Continue Estrace cream  - Reviewed behavioral therapies, such as timed voiding, pelvic floor relaxation while voiding and not voiding in a hurry.  Consider possible pelvic floor physical therapy and biofeedback in the future (discussed)  -Consider med management if the above is not helpful (gave her a list in the chance she is interested).     Follow up prn.      RICKY Campos [facility]    20 min spent face to face with the " patient, >50% of that time spent on counseling and coordination of care of urinary frequency/urgency.      "CJ"  This is a 0 DOL LGA baby boy born at 37.1 weeks to a 37 yo ->P2 mom via vaginal delivery.   Mom is O+, Baby is B+ Elsie +. GBS-(), HBsAg-, RPR-, HIV- and Rubella Immune. ROM of 7 hrs. APGARS 9/9. EOSS of 0.27 at birth.    This was an IVF, own egg pregnancy. NIPT low risk, anatomy scan/growth scans wnl and GCT passed. Maternal history of hypothyroidism on Synthroid 100 mcg qD. No hx of Graves or thyroidectomy. Otherwise only took PNV. The labor and delivery were un-remarkable.     [place] course thus far notable for Elsie + with initial cord bilirubin of 3.9 mg/dl. TsB 5.2 mg/dl @ 3 HOL, LL=6.3 mg/dl, ROR of 0.43 mg/dl/hr in context of Retic of 12.6%. Baby started on intensive triple phototherapy at 5 HOL in nursery. Otherwise baby also found to be hypoglycemic at 1 HOL with chem of 41. This improved with 10 cc formula and dextrose buccal gel x1. Has remained euglycemic thereafter.     General Appearance: comfortable, no distress, no dysmorphic features   Head: normocephalic, anterior fontanelle open and flat  Eyes/ENT: red reflex present b/l, palate intact  Neck/clavicles: no masses, no crepitus  Chest: no grunting, flaring or retractions, clear and equal breath sounds b/l  CV: RRR, nl S1 S2, no murmurs, well perfused  Abdomen: soft, nontender, nondistended, no masses  : Normal male, testes descended b/l  Back: no defects  Extremities: full range of motion, no hip clicks, normal digits. 2+ Femoral pulses.  Neuro: good tone, moves all extremities, symmetric [place], suck, grasp  Skin: no lesions, no jaundice    Laboratory:    Cord Bilirubin: 2.9  H/H: 15.1/41.6   Retic: 12.6%  TsB 5.2 @ 3 HOL, LL=6.3 & ROR 0.43 mg/dl/hr  TsB 7.1 @ 10 HOL s/p 6 hrs of triple phototherapy initatiion, LL=7.6 mg/dl & ROR =0.27 mg/dl/hr    POCT Blood Glucose.: 72 mg/dL (2024 16:44)  POCT Blood Glucose.: 65 mg/dL (2024 10:57)  POCT Blood Glucose.: 99 mg/dL (2024 08:09)  POCT Blood Glucose.: 51 mg/dL (2024 06:26)  POCT Blood Glucose.: 41 mg/dL (2024 05:48)      Assessment:   This is a 0 DOL early term born at 37.1 weeks via vaginal delivery. Baby is Elsie + on intensive phototherapy. Baby is also LGA on hypoglycemia protocol, requiring dextrose buccal gel x1 thus far.     Plan:  Admit to well baby nursery  Normal / Healthy  Care and teaching  Continue triple phototherapy. ROR stabilizing despite TsB increasing. Next TsB 2300 tonight.  Continue hypoglycemia protocol for LGA.   Dextrose gel prn. If baby with persistent hypoglycemia, may require escalation of care to NICU.  Hepatitis B vaccination, Vitamin K injection and Erythromycin eye ointment given  PMD: Family has identified

## 2024-03-13 ENCOUNTER — PRE VISIT (OUTPATIENT)
Dept: OTOLARYNGOLOGY | Facility: CLINIC | Age: 74
End: 2024-03-13

## 2024-03-14 ENCOUNTER — OFFICE VISIT (OUTPATIENT)
Dept: CARDIOLOGY | Facility: CLINIC | Age: 74
End: 2024-03-14
Payer: COMMERCIAL

## 2024-03-14 VITALS
DIASTOLIC BLOOD PRESSURE: 82 MMHG | SYSTOLIC BLOOD PRESSURE: 149 MMHG | HEART RATE: 79 BPM | OXYGEN SATURATION: 100 % | HEIGHT: 64 IN | WEIGHT: 142.3 LBS | BODY MASS INDEX: 24.3 KG/M2

## 2024-03-14 DIAGNOSIS — Q22.5 EBSTEIN'S ANOMALY: ICD-10-CM

## 2024-03-14 DIAGNOSIS — R73.01 ELEVATED FASTING BLOOD SUGAR: ICD-10-CM

## 2024-03-14 DIAGNOSIS — I07.1 TRICUSPID VALVE INSUFFICIENCY, UNSPECIFIED ETIOLOGY: ICD-10-CM

## 2024-03-14 DIAGNOSIS — R13.10 DYSPHAGIA: Primary | ICD-10-CM

## 2024-03-14 DIAGNOSIS — Q24.9 ADULT CONGENITAL HEART DISEASE: ICD-10-CM

## 2024-03-14 PROCEDURE — 99215 OFFICE O/P EST HI 40 MIN: CPT | Performed by: INTERNAL MEDICINE

## 2024-03-14 PROCEDURE — 93000 ELECTROCARDIOGRAM COMPLETE: CPT | Performed by: INTERNAL MEDICINE

## 2024-03-14 NOTE — PROGRESS NOTES
CARDIOLOGY CONSULTATION:    Mr. Herring is a delightful 73-year-old woman who I am meeting for the first time with a past medical history significant for Ebstein's anomaly.  She was followed by Dr. Was last seen in .  She had been seen by Dr. Eubanks at [facility] prior to  and apparently had a recommendation to have tricuspid valve replacement or repair but decided against it.  She reports that she is walking 2 to 4 miles a day and takes her about half an hour to go 2 miles and she reports that the area is hilly she does not feel that she is declined at all.    Ms. Keenan at 9 has 2 siblings that are older and moving and her parents  in their 90s she has 2 other siblings have  of cancer.  She is retired  for high school she has 2 adopted kids 1 of which lives with her.  She has noted in the past year that she has had some change in her speech and she is also noted that she has food that gets stuck that has been for the past 6 months.  She was referred to ENT at the [facility] but decided not to go because she did not want to drive to [place].  She was seen at [facility] and had an MRI of the head in 2023 that was without contrast and that showed no concerning findings.  Her last echo from 2023 I reviewed personally and she had mild to moderately enlarged right ventricle with pretty good function and severe tricuspid valve insufficiency.  Her left ventricular chamber size and function were normal.  Her EKG shows a QRS duration of 116 she has no history of arrhythmias she has not had a monitor in recent years.    Ms. Herring had a CPX at [facility] that was her last 1 sometime before .  She has a history of elevated fasting blood sugar in 2023 at 115 her A1c is 5.6 her cholesterol her LDL was in the 140s she is not on a cholesterol medication her potassium was elevated 5.3 she is currently not on blood pressure medicine reports her blood pressure  at home has been running in the 130s over 80s.    She did have a cardiac MRI that was done in 2012 and read by Dr. Bone her right ventricular end-diastolic volume index was normal at 83 severe tricuspid valve insufficiency and mildly reduced right ventricular function was reported.    PAST MEDICAL HISTORY:  Past Medical History:   Diagnosis Date    Atypical nevus 8/16    Diffuse cystic mastopathy     Ebstein's anomaly     Palpitations     Spider veins     Thoracic or lumbosacral neuritis or radiculitis, unspecified     L leg neuropathy    Tubular adenoma 1/16       CURRENT MEDICATIONS:  Current Outpatient Medications   Medication Sig Dispense Refill    calcium carbonate (OS-RIKA) 500 MG tablet Take 1 tablet by mouth daily      multivitamin w/minerals (THERA-VIT-M) tablet Take 1 tablet by mouth daily      Probiotic Product (FORTIFY 50 BILLION PROBIOT 50+ PO)       SUMAtriptan (IMITREX) 50 MG tablet Take 1 tablet (50 mg) by mouth at onset of headache for migraine May repeat dose in 2 hours. 18 tablet 11    Zinc 25 MG TABS       Biotin 10 MG CAPS          PAST SURGICAL HISTORY:  Past Surgical History:   Procedure Laterality Date    COLONOSCOPY N/A 1/29/2016    Procedure: COMBINED COLONOSCOPY, SINGLE OR MULTIPLE BIOPSY/POLYPECTOMY BY BIOPSY;  Surgeon: Margarette Millan MD;  Location: [facility]    HC BIOPSY OF BREAST, OPEN INCISIONAL      HC REMOVAL OF TONSILS,<11 Y/O      Tonsils <14y.o.    PELVIS LAPAROSCOPY,DX         ALLERGIES  Fentanyl    FAMILY HX:  Family History   Problem Relation Age of Onset    Alzheimer Disease Mother     Arthritis Mother         RA    Hypertension Mother     Cancer Father         brain tumor    C.A.D. Father         MI@70    Cancer - colorectal Sister 62    Hyperthyroidism Sister        SOCIAL HX:  Social History     Socioeconomic History    Marital status:    Tobacco Use    Smoking status: Never    Smokeless tobacco: Never   Vaping Use    Vaping Use: Never used   Substance and Sexual  Activity    Alcohol use: Yes     Comment: occasionally    Drug use: No    Sexual activity: Yes     Partners: Male     Social Determinants of Health     Financial Resource Strain: Low Risk  (9/21/2023)    Financial Resource Strain     Within the past 12 months, have you or your family members you live with been unable to get utilities (heat, electricity) when it was really needed?: No   Food Insecurity: Low Risk  (9/21/2023)    Food Insecurity     Within the past 12 months, did you worry that your food would run out before you got money to buy more?: No     Within the past 12 months, did the food you bought just not last and you didn t have money to get more?: No   Transportation Needs: Low Risk  (9/21/2023)    Transportation Needs     Within the past 12 months, has lack of transportation kept you from medical appointments, getting your medicines, non-medical meetings or appointments, work, or from getting things that you need?: No   Interpersonal Safety: Low Risk  (9/21/2023)    Interpersonal Safety     Do you feel physically and emotionally safe where you currently live?: Yes     Within the past 12 months, have you been hit, slapped, kicked or otherwise physically hurt by someone?: No     Within the past 12 months, have you been humiliated or emotionally abused in other ways by your partner or ex-partner?: No   Housing Stability: Low Risk  (9/21/2023)    Housing Stability     Do you have housing? : Yes     Are you worried about losing your housing?: No       ROS:  Constitutional: No fever, chills, or sweats. No weight gain/loss.   ENT: No visual disturbance, ear ache, epistaxis, sore throat.   Allergies/Immunologic: Negative.   Respiratory: No cough, hemoptysis.   Cardiovascular: As per HPI.   GI: No nausea, vomiting, hematemesis, melena, or hematochezia.   : No urinary frequency, dysuria, or hematuria.   Integument: Negative.   Psychiatric: Negative.   Neuro: Negative.   Endocrinology: Negative.  "  Musculoskeletal: No myalgia.    VITAL SIGNS:  BP (!) 149/82   Pulse 79   Ht 1.626 m (5' 4\")   Wt 64.5 kg (142 lb 4.8 oz)   LMP 08/18/2004   SpO2 100%   BMI 24.43 kg/m    Body mass index is 24.43 kg/m .  Wt Readings from Last 2 Encounters:   03/14/24 64.5 kg (142 lb 4.8 oz)   09/21/23 62.6 kg (138 lb)       PHYSICAL EXAM  Vilma Hughes IS A 73 year old female.in no acute distress.  HEENT: Unremarkable.  Neck: JVP normal.  Carotids +4/4 bilaterally without bruits.  Lungs: CTA.  Cor: RRR. Normal S1 and S2.  No murmur, rub, or gallop.  PMI in Lf 5th ICS.  Abd: Soft, nontender, nondistended.  NABS.  No pulsatile mass.  Extremities: No C/C/E.  Pulses +4/4 symmetric in upper and lower extremities.  Neuro: Grossly intact.    LABS    Lab Results   Component Value Date    WBC 4.7 09/21/2023    WBC 4.9 08/19/2016     Lab Results   Component Value Date    RBC 4.72 09/21/2023    RBC 4.31 08/19/2016     Lab Results   Component Value Date    HGB 14.6 09/21/2023    HGB 13.3 08/19/2016     Lab Results   Component Value Date    HCT 44.3 09/21/2023    HCT 40.7 08/19/2016     No components found for: \"MCT\"  Lab Results   Component Value Date    MCV 94 09/21/2023    MCV 94 08/19/2016     Lab Results   Component Value Date    MCH 30.9 09/21/2023    MCH 30.9 08/19/2016     Lab Results   Component Value Date    MCHC 33.0 09/21/2023    MCHC 32.7 08/19/2016     Lab Results   Component Value Date    RDW 12.7 09/21/2023    RDW 12.9 08/19/2016     Lab Results   Component Value Date     09/21/2023     08/19/2016      Recent Labs   Lab Test 09/21/23  0829 08/19/22  0846    140   POTASSIUM 5.3 4.4   CHLORIDE 106 111*   CO2 23 25   ANIONGAP 12 4   * 107*   BUN 14.9 16   CR 0.84 0.72   RIKA 9.7 9.2     Recent Labs   Lab Test 09/21/23  0829 08/19/22  0846   CHOL 220* 208*   HDL 74 77   * 118*   TRIG 76 63   NHDL 146* 131*        ASSESSMENT AND PLAN:  Ebstein Anomaly - not repaired- with severe TR and moderate " RV enlargement with at most mild decrease in function  Dysphagia  Speech change with negative MRI of the head without contrast, 12/2023  Hyperlipidemia  HTN      It was a pleasure to be involved in the care of Mrs. Hughes reviewed her history and symptoms in detail and testing as outlined personally we discussed her history of Darian anomaly and she feels at this point that she is not limited in terms of her activities I do not have her records from [facility] but we are getting those pulled.  She was apparently recommended to have tricuspid valve replacement or repair before 2012 by Dr. Eubanks but decided against it.  She still is not keen on surgery.  She is willing to do a CPX which we can compare to her prior 1 and see where she falls.  I will hold off on doing a cardiac MRI.  We discussed doing a Holter monitor as well to evaluate for arrhythmias and she would appreciate a coronary calcium score as this could rec change her recommendation with regards to starting a statin.  Given her elevated fasting blood sugar in September 2023 will check an A1c.  She will monitor her blood pressure at home.  Will plan to start spironolactone when she returns with her blood pressure remains high.  I referred her to [facility] that she can see any Janette and she does not want to ideally go to [place] but she is willing to go for CPX.    She understands and agrees with the plan as outlined all questions were answered it was a pleasure to be involved in her care please do not hesitate to contact me with any questions or concerns    RODRIGO Arreaga MD   There was 55 minutes face-to-face, documentation and review of records on the day of the visit

## 2024-03-14 NOTE — PATIENT INSTRUCTIONS
You were seen today in the Adult Congenital and Cardiovascular Genetics Clinic at the [facility].    Cardiology Providers you saw during your visit:  RODRIGO Arreaga MD    Diagnosis:  Ebsteins anomaly    Results:  RODRIGO Arreaga MD reviewed the results of your EKG and echocardiogram  testing today in clinic.    Recommendations for you:    We have placed a referral for GI at [facility]  Complete a coronary calcium scan at [facility]      General Cardiac Recommendations:  Continue to eat a heart healthy, low salt diet.  Continue to get 20-30 minutes of aerobic activity, 4-5 days per week.  Examples of aerobic activity include walking, running, swimming, cycling, etc.  Continue to observe good oral hygiene, with regular dental visits.      SBE prophylaxis:   Yes____  No_X___      FASTING CHOLESTEROL was checked in the last 5 years YES_X__  NO___ (2024)        Follow-up:  Follow up with Dr Arreaga with your CPX, labs and coronary calcium scan complete    CardioPulmonary Stress Test (CPX)  CPX is a maximal (meaning you exercise to exhaustion, not to achieve a heart rate) exercise test where we measure how well your heart/body uses oxygen or energy. It is the gold standard for measuring functional capacity and helps us differentiate limitations due to lungs, heart, or fitness.   A CPX is NOT a typical stress test. You will NOT be asked to hold your Beta Blocker medication.   You will be scheduled for a CardioPulmonary Stress Test at the [facility] ([address], [facility] [address], [phone]).  Follow these instructions:    1. Report to the GOLD waiting room in the [facility] hospital.  2. Nothing to eat for 3 hours prior to your test. You may have clear liquids up to the time of your test.  3. Please wear loose, two-piece clothing and comfortable, rubber-soled shoes for walking.       What happens during this test?   We will place a blood pressure cuff on your arm. We will also attach  small pads to your chest. The pads are hooked to an EKG (electrocardiogram) machine that shows how your heart is working.  You will walk on a treadmill or pedal a bicycle.  You will breathe through a mouthpiece, and the air you breathe out will be measured at rest and during exercise.  We will watch your EKG, heart rate and heart rhythm the entire time.  We will check your blood pressure during the test.  This test takes two (2) hours.      For Patients with Diabetes: Your RN Coordinator will give you instructions on adjusting your diabetic medications for the day of your test                           If you have questions please contact your diabetic care team.                          Remember to  bring your glucometer & insulin with you to take after your test if needed.      If you have questions or concerns please contact us at:    Orly Perez RN, BSN   Kristina Jason (Scheduling)  Nurse Care Coordinator     Clinic   Adult Congenital and CV Genetics   Adult Congenital and CV Genetic  [facility]   [facility]  (P) [phone]     (P) [phone]            (F) [phone]        For after hours urgent needs, call [phone] and ask to speak to the Adult Congenital Physician on call.  Mention Job Code 0401.    For emergencies call [phone].    [facility]   Clinics and Surgery Center  Mail Code [id number]  [address] [place] [address]  [phone]

## 2024-03-14 NOTE — LETTER
3/14/2024    Jyothi White, APRN CNP  [address] [facility] [address]    RE: Vilma Hughes       Dear Colleague,     I had the pleasure of seeing Vilma Hughes in the [facility].  CARDIOLOGY CONSULTATION:    Mr. Herring is a delightful 73-year-old woman who I am meeting for the first time with a past medical history significant for Ebstein's anomaly.  She was followed by Dr. Was last seen in .  She had been seen by Dr. Eubanks at [facility] prior to  and apparently had a recommendation to have tricuspid valve replacement or repair but decided against it.  She reports that she is walking 2 to 4 miles a day and takes her about half an hour to go 2 miles and she reports that the area is hilly she does not feel that she is declined at all.    Ms. Keenan at 9 has 2 siblings that are older and moving and her parents  in their 90s she has 2 other siblings have  of cancer.  She is retired  for high school she has 2 adopted kids 1 of which lives with her.  She has noted in the past year that she has had some change in her speech and she is also noted that she has food that gets stuck that has been for the past 6 months.  She was referred to ENT at the [facility] but decided not to go because she did not want to drive to [place].  She was seen at [facility] and had an MRI of the head in 2023 that was without contrast and that showed no concerning findings.  Her last echo from 2023 I reviewed personally and she had mild to moderately enlarged right ventricle with pretty good function and severe tricuspid valve insufficiency.  Her left ventricular chamber size and function were normal.  Her EKG shows a QRS duration of 116 she has no history of arrhythmias she has not had a monitor in recent years.    Ms. Herring had a CPX at [facility] that was her last 1 sometime before .  She has a history of elevated fasting blood sugar in 2023  at 115 her A1c is 5.6 her cholesterol her LDL was in the 140s she is not on a cholesterol medication her potassium was elevated 5.3 she is currently not on blood pressure medicine reports her blood pressure at home has been running in the 130s over 80s.    She did have a cardiac MRI that was done in 2012 and read by Dr. Bone her right ventricular end-diastolic volume index was normal at 83 severe tricuspid valve insufficiency and mildly reduced right ventricular function was reported.    PAST MEDICAL HISTORY:  Past Medical History:   Diagnosis Date    Atypical nevus 8/16    Diffuse cystic mastopathy     Ebstein's anomaly     Palpitations     Spider veins     Thoracic or lumbosacral neuritis or radiculitis, unspecified     L leg neuropathy    Tubular adenoma 1/16       CURRENT MEDICATIONS:  Current Outpatient Medications   Medication Sig Dispense Refill    calcium carbonate (OS-RIKA) 500 MG tablet Take 1 tablet by mouth daily      multivitamin w/minerals (THERA-VIT-M) tablet Take 1 tablet by mouth daily      Probiotic Product (FORTIFY 50 BILLION PROBIOT 50+ PO)       SUMAtriptan (IMITREX) 50 MG tablet Take 1 tablet (50 mg) by mouth at onset of headache for migraine May repeat dose in 2 hours. 18 tablet 11    Zinc 25 MG TABS       Biotin 10 MG CAPS          PAST SURGICAL HISTORY:  Past Surgical History:   Procedure Laterality Date    COLONOSCOPY N/A 1/29/2016    Procedure: COMBINED COLONOSCOPY, SINGLE OR MULTIPLE BIOPSY/POLYPECTOMY BY BIOPSY;  Surgeon: Margarette Millan MD;  Location: [facility]    HC BIOPSY OF BREAST, OPEN INCISIONAL      HC REMOVAL OF TONSILS,<13 Y/O      Tonsils <14y.o.    PELVIS LAPAROSCOPY,DX         ALLERGIES  Fentanyl    FAMILY HX:  Family History   Problem Relation Age of Onset    Alzheimer Disease Mother     Arthritis Mother         RA    Hypertension Mother     Cancer Father         brain tumor    C.A.D. Father         MI@70    Cancer - colorectal Sister 62    Hyperthyroidism Sister         SOCIAL HX:  Social History     Socioeconomic History    Marital status:    Tobacco Use    Smoking status: Never    Smokeless tobacco: Never   Vaping Use    Vaping Use: Never used   Substance and Sexual Activity    Alcohol use: Yes     Comment: occasionally    Drug use: No    Sexual activity: Yes     Partners: Male     Social Determinants of Health     Financial Resource Strain: Low Risk  (9/21/2023)    Financial Resource Strain     Within the past 12 months, have you or your family members you live with been unable to get utilities (heat, electricity) when it was really needed?: No   Food Insecurity: Low Risk  (9/21/2023)    Food Insecurity     Within the past 12 months, did you worry that your food would run out before you got money to buy more?: No     Within the past 12 months, did the food you bought just not last and you didn t have money to get more?: No   Transportation Needs: Low Risk  (9/21/2023)    Transportation Needs     Within the past 12 months, has lack of transportation kept you from medical appointments, getting your medicines, non-medical meetings or appointments, work, or from getting things that you need?: No   Interpersonal Safety: Low Risk  (9/21/2023)    Interpersonal Safety     Do you feel physically and emotionally safe where you currently live?: Yes     Within the past 12 months, have you been hit, slapped, kicked or otherwise physically hurt by someone?: No     Within the past 12 months, have you been humiliated or emotionally abused in other ways by your partner or ex-partner?: No   Housing Stability: Low Risk  (9/21/2023)    Housing Stability     Do you have housing? : Yes     Are you worried about losing your housing?: No       ROS:  Constitutional: No fever, chills, or sweats. No weight gain/loss.   ENT: No visual disturbance, ear ache, epistaxis, sore throat.   Allergies/Immunologic: Negative.   Respiratory: No cough, hemoptysis.   Cardiovascular: As per HPI.   GI: No  "nausea, vomiting, hematemesis, melena, or hematochezia.   : No urinary frequency, dysuria, or hematuria.   Integument: Negative.   Psychiatric: Negative.   Neuro: Negative.   Endocrinology: Negative.   Musculoskeletal: No myalgia.    VITAL SIGNS:  BP (!) 149/82   Pulse 79   Ht 1.626 m (5' 4\")   Wt 64.5 kg (142 lb 4.8 oz)   LMP 08/18/2004   SpO2 100%   BMI 24.43 kg/m    Body mass index is 24.43 kg/m .  Wt Readings from Last 2 Encounters:   03/14/24 64.5 kg (142 lb 4.8 oz)   09/21/23 62.6 kg (138 lb)       PHYSICAL EXAM  Vilma Hughes IS A 73 year old female.in no acute distress.  HEENT: Unremarkable.  Neck: JVP normal.  Carotids +4/4 bilaterally without bruits.  Lungs: CTA.  Cor: RRR. Normal S1 and S2.  No murmur, rub, or gallop.  PMI in Lf 5th ICS.  Abd: Soft, nontender, nondistended.  NABS.  No pulsatile mass.  Extremities: No C/C/E.  Pulses +4/4 symmetric in upper and lower extremities.  Neuro: Grossly intact.    LABS    Lab Results   Component Value Date    WBC 4.7 09/21/2023    WBC 4.9 08/19/2016     Lab Results   Component Value Date    RBC 4.72 09/21/2023    RBC 4.31 08/19/2016     Lab Results   Component Value Date    HGB 14.6 09/21/2023    HGB 13.3 08/19/2016     Lab Results   Component Value Date    HCT 44.3 09/21/2023    HCT 40.7 08/19/2016     No components found for: \"MCT\"  Lab Results   Component Value Date    MCV 94 09/21/2023    MCV 94 08/19/2016     Lab Results   Component Value Date    MCH 30.9 09/21/2023    MCH 30.9 08/19/2016     Lab Results   Component Value Date    MCHC 33.0 09/21/2023    MCHC 32.7 08/19/2016     Lab Results   Component Value Date    RDW 12.7 09/21/2023    RDW 12.9 08/19/2016     Lab Results   Component Value Date     09/21/2023     08/19/2016      Recent Labs   Lab Test 09/21/23  0829 08/19/22  0846    140   POTASSIUM 5.3 4.4   CHLORIDE 106 111*   CO2 23 25   ANIONGAP 12 4   * 107*   BUN 14.9 16   CR 0.84 0.72   RIKA 9.7 9.2     Recent Labs "   Lab Test 09/21/23  0829 08/19/22  0846   CHOL 220* 208*   HDL 74 77   * 118*   TRIG 76 63   NHDL 146* 131*        ASSESSMENT AND PLAN:  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 Hughes reviewed her history and symptoms in detail and testing as outlined personally we discussed her history of Darian anomaly and she feels at this point that she is not limited in terms of her activities I do not have her records from [facility] but we are getting those pulled.  She was apparently recommended to have tricuspid valve replacement or repair before 2012 by Dr. Eubanks but decided against it.  She still is not keen on surgery.  She is willing to do a CPX which we can compare to her prior 1 and see where she falls.  I will hold off on doing a cardiac MRI.  We discussed doing a Holter monitor as well to evaluate for arrhythmias and she would appreciate a coronary calcium score as this could rec change her recommendation with regards to starting a statin.  Given her elevated fasting blood sugar in September 2023 will check an A1c.  She will monitor her blood pressure at home.  Will plan to start spironolactone when she returns with her blood pressure remains high.  I referred her to [facility] that she can see any Janette and she does not want to ideally go to [place] but she is willing to go for CPX.    She understands and agrees with the plan as outlined all questions were answered it was a pleasure to be involved in her care please do not hesitate to contact me with any questions or concerns    RODRIGO Arreaga MD   There was 55 minutes face-to-face, documentation and review of records on the day of the visit    Thank you for allowing me to participate in the care of your  patient.      Sincerely,     Sondra Arreaga MD     [facility]  cc:   ROQUE Cid CNP  [address] [place] [address]

## 2024-03-15 ENCOUNTER — TELEPHONE (OUTPATIENT)
Dept: CARDIOLOGY | Facility: CLINIC | Age: 74
End: 2024-03-15
Payer: COMMERCIAL

## 2024-03-15 NOTE — TELEPHONE ENCOUNTER
Attempted to reach pt to address question regarding labs and if she needs to have her labs along with the CT scan. I was also attempting to schedule her appts as well, no answer, LVM

## 2024-04-15 ENCOUNTER — HOSPITAL ENCOUNTER (OUTPATIENT)
Dept: CARDIOLOGY | Facility: CLINIC | Age: 74
Discharge: HOME | End: 2024-04-15
Attending: INTERNAL MEDICINE | Admitting: INTERNAL MEDICINE
Payer: COMMERCIAL

## 2024-04-15 DIAGNOSIS — I07.1 TRICUSPID VALVE INSUFFICIENCY, UNSPECIFIED ETIOLOGY: ICD-10-CM

## 2024-04-15 DIAGNOSIS — Q24.9 ADULT CONGENITAL HEART DISEASE: ICD-10-CM

## 2024-04-15 DIAGNOSIS — Q22.5 EBSTEIN'S ANOMALY: ICD-10-CM

## 2024-04-15 PROCEDURE — 75571 CT HRT W/O DYE W/CA TEST: CPT | Mod: 26 | Performed by: INTERNAL MEDICINE

## 2024-04-15 PROCEDURE — 75571 CT HRT W/O DYE W/CA TEST: CPT

## 2024-04-22 ENCOUNTER — TRANSFERRED RECORDS (OUTPATIENT)
Dept: HEALTH INFORMATION MANAGEMENT | Facility: CLINIC | Age: 74
End: 2024-04-22
Payer: COMMERCIAL

## 2024-05-06 ENCOUNTER — TELEPHONE (OUTPATIENT)
Dept: CARDIOLOGY | Facility: CLINIC | Age: 74
End: 2024-05-06
Payer: COMMERCIAL

## 2024-05-06 NOTE — TELEPHONE ENCOUNTER
Spoke with in regards to getting her rescheduled. Pt stated that the date I gave her to reschedule to wouldn't work as [place]. She would like do her testing but will be diana to see Dr. Arreaga.

## 2024-05-06 NOTE — TELEPHONE ENCOUNTER
Attempted to return pt call wanting to reschedule her appts. Pt gave some dates that she is available to reschedule. I left a detailed message explaining that Dr. Arreaga was not available in the timeframe that she was requesting. Informed her of when her next available is and asked her for call back to help get her rescheduled.

## 2024-05-06 NOTE — TELEPHONE ENCOUNTER
Pts labs and CPX have been rescheduled to 8/19 per pt wishes. I LVM with updated appt information and let her know that we are working on figuring out when she can see Delfino

## 2024-05-16 ENCOUNTER — OFFICE VISIT (OUTPATIENT)
Dept: INTERNAL MEDICINE | Facility: CLINIC | Age: 74
End: 2024-05-16
Payer: COMMERCIAL

## 2024-05-16 VITALS
DIASTOLIC BLOOD PRESSURE: 82 MMHG | HEIGHT: 64 IN | OXYGEN SATURATION: 98 % | TEMPERATURE: 97.5 F | WEIGHT: 141.8 LBS | BODY MASS INDEX: 24.21 KG/M2 | HEART RATE: 82 BPM | RESPIRATION RATE: 18 BRPM | SYSTOLIC BLOOD PRESSURE: 120 MMHG

## 2024-05-16 DIAGNOSIS — R73.09 ELEVATED GLUCOSE: ICD-10-CM

## 2024-05-16 DIAGNOSIS — Z00.00 ENCOUNTER FOR ANNUAL WELLNESS EXAM IN MEDICARE PATIENT: Primary | ICD-10-CM

## 2024-05-16 DIAGNOSIS — G43.009 NONINTRACTABLE MIGRAINE, UNSPECIFIED MIGRAINE TYPE: ICD-10-CM

## 2024-05-16 DIAGNOSIS — Z13.1 SCREENING FOR DIABETES MELLITUS: ICD-10-CM

## 2024-05-16 DIAGNOSIS — Z13.220 SCREENING FOR HYPERLIPIDEMIA: ICD-10-CM

## 2024-05-16 DIAGNOSIS — Q22.5 EBSTEIN'S ANOMALY: ICD-10-CM

## 2024-05-16 LAB — HBA1C MFR BLD: 5.5 % (ref 0–5.6)

## 2024-05-16 PROCEDURE — G0439 PPPS, SUBSEQ VISIT: HCPCS

## 2024-05-16 PROCEDURE — 80048 BASIC METABOLIC PNL TOTAL CA: CPT

## 2024-05-16 PROCEDURE — 99213 OFFICE O/P EST LOW 20 MIN: CPT | Mod: 25

## 2024-05-16 PROCEDURE — 80061 LIPID PANEL: CPT

## 2024-05-16 PROCEDURE — 83036 HEMOGLOBIN GLYCOSYLATED A1C: CPT

## 2024-05-16 PROCEDURE — 36415 COLL VENOUS BLD VENIPUNCTURE: CPT

## 2024-05-16 RX ORDER — AMOXICILLIN 500 MG/1
CAPSULE ORAL
Qty: 20 CAPSULE | Refills: 1 | Status: SHIPPED | OUTPATIENT
Start: 2024-05-16

## 2024-05-16 RX ORDER — SUMATRIPTAN 50 MG/1
50 TABLET, FILM COATED ORAL
Qty: 18 TABLET | Refills: 11 | Status: SHIPPED | OUTPATIENT
Start: 2024-05-16

## 2024-05-16 SDOH — HEALTH STABILITY: PHYSICAL HEALTH: ON AVERAGE, HOW MANY MINUTES DO YOU ENGAGE IN EXERCISE AT THIS LEVEL?: 40 MIN

## 2024-05-16 SDOH — HEALTH STABILITY: PHYSICAL HEALTH: ON AVERAGE, HOW MANY DAYS PER WEEK DO YOU ENGAGE IN MODERATE TO STRENUOUS EXERCISE (LIKE A BRISK WALK)?: 7 DAYS

## 2024-05-16 ASSESSMENT — PAIN SCALES - GENERAL: PAINLEVEL: NO PAIN (0)

## 2024-05-16 ASSESSMENT — SOCIAL DETERMINANTS OF HEALTH (SDOH)
HOW OFTEN DO YOU GET TOGETHER WITH FRIENDS OR RELATIVES?: PATIENT DECLINED
HOW OFTEN DO YOU GET TOGETHER WITH FRIENDS OR RELATIVES?: PATIENT DECLINED

## 2024-05-16 NOTE — PATIENT INSTRUCTIONS
Try using flonase (fluticasone) nasal spray daily for 3 weeks. See if this helps at all with the throat clearing.    If this doesn't improve symptoms at all, I would recommend trying Pepcid (also known as famotidine) 20MG daily to see if this helps as your symptoms could also be due to silent acid reflux. Try this for 3-4 weeks.         Please let me know if you have any questions.    Thanks!  ROQUE Grossman, CNP   [facility]

## 2024-05-16 NOTE — PROGRESS NOTES
Preventive Care Visit  [facility]  ROQUE Grossman CNP, Internal Medicine  May 16, 2024      Assessment & Plan     (Z00.00) Encounter for annual wellness exam in Medicare patient  (primary encounter diagnosis)  Comment: pt presents for annual wellness.  Today Vilma tells me she is bothered with having to clear her throat constantly.   She denies any heartburn or regurgitation. She does have occasional dry cough and c/o frequent rhinitis and having to blow her nose.  She had asked her cardiologist to send a referral to GI.  I have advised her to try using Flonase daily for 3 weeks to see if this helps with symptoms.  If she has no relief in symptoms with Flonase, I recommend she try Pepcid for 3 to 4 weeks prior to seeing GI as her symptoms may be easily resolved with 1 of these treatments.  I told her she should get GI appointment scheduled as there are often many months out new symptoms resolve she can cancel this.  I can always send referral to [facility] if [facility] is on any openings.  She will let me know  Plan:     (Q22.5) Ebstein's anomaly  Comment: Following with cardiology for history of Ebstein's anomaly.   Plan: amoxicillin (AMOXIL) 500 MG capsule            (G43.009) Nonintractable migraine, unspecified migraine type  Comment:   Plan: SUMAtriptan (IMITREX) 50 MG tablet            (Z13.1) Screening for diabetes mellitus  Comment:   Plan: Basic metabolic panel  (Ca, Cl, CO2, Creat,         Gluc, K, Na, BUN)            (Z13.220) Screening for hyperlipidemia  Comment:   Plan: Lipid panel reflex to direct LDL Fasting            (R73.09) Elevated glucose  Comment:   Plan: Hemoglobin A1c                      Counseling  Appropriate preventive services were discussed with this patient, including applicable screening as appropriate for fall prevention, nutrition, physical activity, Tobacco-use cessation, weight loss and cognition.  Checklist reviewing preventive services available  has been given to the patient.  Reviewed patient's diet, addressing concerns and/or questions.                   Aby Esparza is a 73 year old, presenting for the following:  Wellness Visit and Refill Request (Amoxicillin for dentist.)        5/16/2024     8:10 AM   Additional Questions   Roomed by Lissette Cao         Health Care Directive  Patient does not have a Health Care Directive or Living Will: Discussed advance care planning with patient; however, patient declined at this time.    HPI              5/16/2024   General Health   How would you rate your overall physical health? Good   Feel stress (tense, anxious, or unable to sleep) Not at all         5/16/2024   Nutrition   Diet: Regular (no restrictions)         5/16/2024   Exercise   Days per week of moderate/strenous exercise 7 days   Average minutes spent exercising at this level 40 min         5/16/2024   Social Factors   Frequency of gathering with friends or relatives Patient declined   Worry food won't last until get money to buy more No   Food not last or not have enough money for food? No   Do you have housing?  Yes   Are you worried about losing your housing? No   Lack of transportation? No   Unable to get utilities (heat,electricity)? No         5/16/2024   Fall Risk   Fallen 2 or more times in the past year? No    No   Trouble with walking or balance? No    No          5/16/2024   Activities of Daily Living- Home Safety   Needs help with the following daily activites None of the above   Safety concerns in the home None of the above         5/16/2024   Dental   Dentist two times every year? Yes         5/16/2024   Hearing Screening   Hearing concerns? None of the above         5/16/2024   Driving Risk Screening   Patient/family members have concerns about driving No         5/16/2024   General Alertness/Fatigue Screening   Have you been more tired than usual lately? No         5/16/2024   Urinary Incontinence Screening   Bothered by leaking  urine in past 6 months No         2024   TB Screening   Were you born outside of the [place]? No         Today's PHQ-2 Score:       2024     8:04 AM   PHQ-2 (  [employer])   Q1: Little interest or pleasure in doing things 0   Q2: Feeling down, depressed or hopeless 0   PHQ-2 Score 0   Q1: Little interest or pleasure in doing things Not at all   Q2: Feeling down, depressed or hopeless Not at all   PHQ-2 Score 0           2024   Substance Use   Alcohol more than 3/day or more than 7/wk No   Do you have a current opioid prescription? No   How severe/bad is pain from 1 to 10? 0/10 (No Pain)   Do you use any other substances recreationally? No     Social History     Tobacco Use    Smoking status: Never     Passive exposure: Never    Smokeless tobacco: Never   Vaping Use    Vaping status: Never Used   Substance Use Topics    Alcohol use: Yes     Comment: occasionally    Drug use: No              ASCVD Risk   The 10-year ASCVD risk score (Kevin STRONG, et al., 2019) is: 9.2%    Values used to calculate the score:      Age: 73 years      Sex: Female      Is Non- : No      Diabetic: No      Tobacco smoker: No      Systolic Blood Pressure: 120 mmHg      Is BP treated: No      HDL Cholesterol: 74 mg/dL      Total Cholesterol: 220 mg/dL    Fracture Risk Assessment Tool  Link to Frax Calculator  Use the information below to complete the Frax calculator  : 1950  Sex: female  Weight (kg): 64.3 kg (actual weight)  Height (cm): 162.6 cm  Previous Fragility Fracture:  No  History of parent with fractured hip:  No  Current Smoking:  No  Patient has been on glucocorticoids for more than 3 months (5mg/day or more): No  Rheumatoid Arthritis on Problem List:  No  Secondary Osteoporosis on Problem List:  No  Consumes 3 or more units of alcohol per day: No  Femoral Neck BMD (g/cm2)        Do you have a current opioid prescription? No  Do you use any other controlled substances or medications  "that are not prescribed by a provider? None          Reviewed and updated as needed this visit by Provider                      Current providers sharing in care for this patient include:  Patient Care Team:  Jyothi White APRN CNP as PCP - General (Internal Medicine)  Jyothi White APRN CNP as Assigned PCP  Blanca Velez PA-C as Physician Assistant (Otolaryngology)  Sondra Arreaga MD as Assigned Heart and Vascular Provider    The following health maintenance items are reviewed in Epic and correct as of today:  Health Maintenance   Topic Date Due    MEDICARE ANNUAL WELLNESS VISIT  08/19/2023    COVID-19 Vaccine (6 - 2023-24 season) 02/02/2024    ANNUAL REVIEW OF HM ORDERS  09/21/2024    FALL RISK ASSESSMENT  05/16/2025    MAMMO SCREENING  06/12/2025    COLORECTAL CANCER SCREENING  05/21/2026    GLUCOSE  09/21/2026    LIPID  09/21/2028    ADVANCE CARE PLANNING  05/16/2029    DTAP/TDAP/TD IMMUNIZATION (4 - Td or Tdap) 04/22/2031    DEXA  11/09/2032    HEPATITIS C SCREENING  Completed    MIGRAINE ACTION PLAN  Completed    PHQ-2 (once per calendar year)  Completed    INFLUENZA VACCINE  Completed    Pneumococcal Vaccine: 65+ Years  Completed    ZOSTER IMMUNIZATION  Completed    RSV VACCINE (Pregnancy & 60+)  Completed    IPV IMMUNIZATION  Aged Out    HPV IMMUNIZATION  Aged Out    MENINGITIS IMMUNIZATION  Aged Out    RSV MONOCLONAL ANTIBODY  Aged Out         Review of Systems  CONSTITUTIONAL: NEGATIVE for fever, chills, change in weight  RESP: NEGATIVE for significant cough or SOB  CV: NEGATIVE for chest pain, palpitations or peripheral edema       Objective    Exam  /82   Pulse 82   Temp 97.5  F (36.4  C) (Tympanic)   Resp 18   Ht 1.626 m (5' 4\")   Wt 64.3 kg (141 lb 12.8 oz)   LMP 08/18/2004 (Approximate)   SpO2 98%   Breastfeeding No   BMI 24.34 kg/m     Estimated body mass index is 24.34 kg/m  as calculated from the following:    Height as of this encounter: 1.626 m (5' 4\").    Weight as of " this encounter: 64.3 kg (141 lb 12.8 oz).        Physical Exam  Constitutional:       General: She is not in acute distress.     Appearance: Normal appearance. She is not ill-appearing, toxic-appearing or diaphoretic.   HENT:      Head: Normocephalic and atraumatic.   Eyes:      Conjunctiva/sclera: Conjunctivae normal.   Cardiovascular:      Rate and Rhythm: Normal rate and regular rhythm.      Heart sounds: Normal heart sounds.   Pulmonary:      Effort: Pulmonary effort is normal.      Breath sounds: Normal breath sounds.   Skin:     General: Skin is warm and dry.   Neurological:      Mental Status: She is alert and oriented to person, place, and time.   Psychiatric:         Mood and Affect: Mood normal.         Behavior: Behavior normal.         Thought Content: Thought content normal.         Judgment: Judgment normal.           5/16/2024   Mini Cog   Clock Draw Score 2 Normal   3 Item Recall 2 objects recalled   Mini Cog Total Score 4              Signed Electronically by: ROQUE Grossman CNP

## 2024-05-17 ENCOUNTER — MYC MEDICAL ADVICE (OUTPATIENT)
Dept: INTERNAL MEDICINE | Facility: CLINIC | Age: 74
End: 2024-05-17
Payer: COMMERCIAL

## 2024-05-17 LAB
ANION GAP SERPL CALCULATED.3IONS-SCNC: 12 MMOL/L (ref 7–15)
BUN SERPL-MCNC: 15.9 MG/DL (ref 8–23)
CALCIUM SERPL-MCNC: 9.4 MG/DL (ref 8.8–10.2)
CHLORIDE SERPL-SCNC: 108 MMOL/L (ref 98–107)
CHOLEST SERPL-MCNC: 237 MG/DL
CREAT SERPL-MCNC: 0.71 MG/DL (ref 0.51–0.95)
DEPRECATED HCO3 PLAS-SCNC: 22 MMOL/L (ref 22–29)
EGFRCR SERPLBLD CKD-EPI 2021: 90 ML/MIN/1.73M2
FASTING STATUS PATIENT QL REPORTED: YES
FASTING STATUS PATIENT QL REPORTED: YES
GLUCOSE SERPL-MCNC: 108 MG/DL (ref 70–99)
HDLC SERPL-MCNC: 78 MG/DL
LDLC SERPL CALC-MCNC: 146 MG/DL
NONHDLC SERPL-MCNC: 159 MG/DL
POTASSIUM SERPL-SCNC: 4.8 MMOL/L (ref 3.4–5.3)
SODIUM SERPL-SCNC: 142 MMOL/L (ref 135–145)
TRIGL SERPL-MCNC: 66 MG/DL

## 2024-05-20 ENCOUNTER — PATIENT OUTREACH (OUTPATIENT)
Dept: GASTROENTEROLOGY | Facility: CLINIC | Age: 74
End: 2024-05-20
Payer: COMMERCIAL

## 2024-06-14 ENCOUNTER — TELEPHONE (OUTPATIENT)
Dept: CARDIOLOGY | Facility: CLINIC | Age: 74
End: 2024-06-14
Payer: COMMERCIAL

## 2024-06-14 NOTE — TELEPHONE ENCOUNTER
[facility] Call Center    Phone Message    May a detailed message be left on voicemail: yes     Reason for Call: Other: Vilma called because she is looking into getting a colonoscopy at an outpatient clinic and the team doing the procedure wanted to know if Vilma's cardiology team wanted her to wear a heart monitor prior to her colonoscopy. If Vilma does not need to wear a heart monitor, her team is requesting a letter from Dr. Arreaga stating why she does not need a monitor. Vilma did not have the fax number to send a letter to at the time of calling. Please reach out to Vilma to discuss. Thank you!     Action Taken: Other: Cardiology    Travel Screening: Not Applicable    Thank you!  [facility]       Date of Service:

## 2024-06-19 ENCOUNTER — ORDERS ONLY (AUTO-RELEASED) (OUTPATIENT)
Dept: CARDIOLOGY | Facility: CLINIC | Age: 74
End: 2024-06-19
Payer: COMMERCIAL

## 2024-06-19 ENCOUNTER — MYC MEDICAL ADVICE (OUTPATIENT)
Dept: CARDIOLOGY | Facility: CLINIC | Age: 74
End: 2024-06-19
Payer: COMMERCIAL

## 2024-06-19 DIAGNOSIS — Q22.5 EBSTEIN'S ANOMALY: ICD-10-CM

## 2024-06-19 DIAGNOSIS — I07.1 TRICUSPID VALVE INSUFFICIENCY, UNSPECIFIED ETIOLOGY: ICD-10-CM

## 2024-06-19 DIAGNOSIS — Q24.9 ADULT CONGENITAL HEART DISEASE: ICD-10-CM

## 2024-06-19 DIAGNOSIS — Q22.5 EBSTEIN'S ANOMALY: Primary | ICD-10-CM

## 2024-06-19 NOTE — LETTER
2024      TO: Vilma Hughes  [address]  [facility] [address]         To whom it may concern,        My patient, Vilma Hughes ( 1950) is closely followed by the Adult Congenital Heart Disease clinic at [facility] ([facility] There are no cardiac contraindications to her completing a routine colonoscopy at [facility] in [place], [place]. She does not require SBE prophylaxis.     Please reach out with any questions or concerns. Thank you.          RODRIGO Arreaga M.D.  Adult Congenital Cardiac Disease  Interventional Cardiology  [address] [facility], [address]    P: [phone]  F: [phone]

## 2024-06-19 NOTE — TELEPHONE ENCOUNTER
Date: 6/19/2024    Time of Call: 2:18 PM     Diagnosis:  Ebstein's anomaly     [ TORB ] Ordering provider: Dr. Fields March  Order: 1 week ZioPatch     Order received by: Sarah MAGANA RN      Follow-up/additional notes: in anticipation of colonoscopy on 7/16/24

## 2024-06-27 NOTE — TELEPHONE ENCOUNTER
Pt called and left a message stating that [facility] my not be able to do a routine colonoscopy since it takes a couple of weeks to get the results back. She also stated that they never received the letter that was sent to them. She is looking for a callback from JEANETTE Smith or she can send a [employer] message on how to proceed forward.

## 2024-06-28 NOTE — TELEPHONE ENCOUNTER
Talked to [facility] nurse who says that the letter was received and that they are OK with going forward with the colonoscopy. Message sent to patient via [employer]

## 2024-07-08 ENCOUNTER — MYC MEDICAL ADVICE (OUTPATIENT)
Dept: CARDIOLOGY | Facility: CLINIC | Age: 74
End: 2024-07-08
Payer: COMMERCIAL

## 2024-07-16 ENCOUNTER — TRANSFERRED RECORDS (OUTPATIENT)
Dept: HEALTH INFORMATION MANAGEMENT | Facility: CLINIC | Age: 74
End: 2024-07-16
Payer: COMMERCIAL

## 2024-07-18 PROCEDURE — 93248 EXT ECG>7D<15D REV&INTERPJ: CPT | Performed by: INTERNAL MEDICINE

## 2024-08-01 PROCEDURE — 88346 IMFLUOR 1ST 1ANTB STAIN PX: CPT | Mod: TC | Performed by: DERMATOLOGY

## 2024-08-06 ENCOUNTER — LAB REQUISITION (OUTPATIENT)
Dept: LAB | Facility: CLINIC | Age: 74
End: 2024-08-06

## 2024-08-09 LAB
PATH REPORT.COMMENTS IMP SPEC: NORMAL
PATH REPORT.FINAL DX SPEC: NORMAL
PATH REPORT.GROSS SPEC: NORMAL
PATH REPORT.MICROSCOPIC SPEC OTHER STN: NORMAL
PATH REPORT.RELEVANT HX SPEC: NORMAL
PHOTO IMAGE: NORMAL

## 2024-11-12 ENCOUNTER — TELEPHONE (OUTPATIENT)
Dept: CARDIOLOGY | Facility: CLINIC | Age: 74
End: 2024-11-12
Payer: COMMERCIAL

## 2024-11-12 NOTE — TELEPHONE ENCOUNTER
Attempted to reach patient to reschedule appointment scheduled on 3/18/24 with Dr. Arreaga. No answer, voicemail left.

## 2025-03-31 NOTE — PATIENT INSTRUCTIONS
"Thank you for visiting the Adult Congenital and Cardiovascular Genetics Clinic at the [facility].    Cardiology Providers you saw during your visit:  RODRIGO Arreaga MD    Diagnosis:  Ebstein's anomaly    Results:  RODRIGO Arreaga MD reviewed the results of your labs, CPX and EKG testing today in clinic.    If you have questions or concerns, please call us at [phone] or contact us through [employer].  ______________________________________________________________________________    Recommendations from your Cardiology Provider TODAY:    START losartan 50mg once a day.   Please have a CMP drawn about 2 weeks after starting to make sure that your kidneys and electrolytes are handling the medication well.   We will get you scheduled for an echocardiogram at next available and update you with results.      ____________________________________________________________________________________________________    Follow-up Plan:  Follow-up with Dr. Arreaga in 1 year with repeat echocardiogram prior.     ____________________________________________________________________________________________________    If you have questions or concerns, please call us at [phone] or contact us through [employer].    Sarah Wolf RN, BSN    Pattie Bergman (Scheduling)  Nurse Care Coordinator     Clinic   Adult Congenital and CV Genetics  Adult Congenital   [facility]  [facility]  (P) [phone]     (P) [phone]  (F) [phone]     (F) [phone]        For after hours urgent needs, call [phone] and ask to speak to the \"On-Call Cardiologist.\"    For emergencies call 200.    ____________________________________________________________________________________________________    Additional Important Information for Your Heart Health      General Cardiac Recommendations:  Continue to eat a heart healthy, low salt diet.  Continue to get 20-30 " "minutes of aerobic activity, 4-5 days per week.  Examples of aerobic activity include walking, running, swimming, cycling, etc.  Continue to observe good oral hygiene, with regular dental visits.        SBE prophylaxis (\"Do you need antibiotics before dentist appointments?\"):   Yes____  No__X__    SBE prophylaxis applies to patients with certain heart conditions who are recommended to take antibiotics before dental appointments and other specific procedures. These antibiotics are to help prevent an infection of the heart (endocarditis) that certain patients are at higher risk of developing. The guidelines used come from the American Heart Association and are periodically updated.        FASTING CHOLESTEROL was checked in the last 5 years YES__X__  NO____ (2024)  If no, please follow up with your primary care physician. You should have a cholesterol screening every 5 years at minimum, and every year if taking a medication for your cholesterol levels.     "

## 2025-04-01 ENCOUNTER — HOSPITAL ENCOUNTER (OUTPATIENT)
Dept: CARDIOLOGY | Facility: CLINIC | Age: 75
Discharge: HOME | End: 2025-04-01
Attending: INTERNAL MEDICINE
Payer: COMMERCIAL

## 2025-04-01 ENCOUNTER — OFFICE VISIT (OUTPATIENT)
Dept: CARDIOLOGY | Facility: CLINIC | Age: 75
End: 2025-04-01
Attending: INTERNAL MEDICINE
Payer: COMMERCIAL

## 2025-04-01 VITALS
WEIGHT: 144.9 LBS | BODY MASS INDEX: 24.86 KG/M2 | OXYGEN SATURATION: 100 % | DIASTOLIC BLOOD PRESSURE: 82 MMHG | SYSTOLIC BLOOD PRESSURE: 135 MMHG | HEART RATE: 90 BPM

## 2025-04-01 VITALS — BODY MASS INDEX: 24.43 KG/M2 | WEIGHT: 143.08 LBS | HEIGHT: 64 IN

## 2025-04-01 DIAGNOSIS — I07.1 TRICUSPID VALVE INSUFFICIENCY, UNSPECIFIED ETIOLOGY: ICD-10-CM

## 2025-04-01 DIAGNOSIS — Q24.9 ADULT CONGENITAL HEART DISEASE: ICD-10-CM

## 2025-04-01 DIAGNOSIS — Q22.5 EBSTEIN'S ANOMALY: ICD-10-CM

## 2025-04-01 DIAGNOSIS — I10 ESSENTIAL HYPERTENSION: Primary | ICD-10-CM

## 2025-04-01 LAB
ALBUMIN SERPL BCG-MCNC: 4.4 G/DL (ref 3.5–5.2)
ALP SERPL-CCNC: 135 U/L (ref 40–150)
ALT SERPL W P-5'-P-CCNC: 12 U/L (ref 0–50)
ANION GAP SERPL CALCULATED.3IONS-SCNC: 12 MMOL/L (ref 7–15)
AST SERPL W P-5'-P-CCNC: 19 U/L (ref 0–45)
BILIRUB SERPL-MCNC: 0.4 MG/DL
BUN SERPL-MCNC: 11.8 MG/DL (ref 8–23)
CALCIUM SERPL-MCNC: 9.3 MG/DL (ref 8.8–10.4)
CHLORIDE SERPL-SCNC: 107 MMOL/L (ref 98–107)
CREAT SERPL-MCNC: 0.75 MG/DL (ref 0.51–0.95)
EGFRCR SERPLBLD CKD-EPI 2021: 84 ML/MIN/1.73M2
GLUCOSE SERPL-MCNC: 105 MG/DL (ref 70–99)
HCO3 SERPL-SCNC: 23 MMOL/L (ref 22–29)
POTASSIUM SERPL-SCNC: 4.1 MMOL/L (ref 3.4–5.3)
PROT SERPL-MCNC: 7.3 G/DL (ref 6.4–8.3)
SODIUM SERPL-SCNC: 142 MMOL/L (ref 135–145)

## 2025-04-01 PROCEDURE — 94621 CARDIOPULM EXERCISE TESTING: CPT

## 2025-04-01 PROCEDURE — 82310 ASSAY OF CALCIUM: CPT | Performed by: INTERNAL MEDICINE

## 2025-04-01 PROCEDURE — 82040 ASSAY OF SERUM ALBUMIN: CPT | Performed by: INTERNAL MEDICINE

## 2025-04-01 PROCEDURE — G0463 HOSPITAL OUTPT CLINIC VISIT: HCPCS | Mod: 25 | Performed by: INTERNAL MEDICINE

## 2025-04-01 PROCEDURE — 36415 COLL VENOUS BLD VENIPUNCTURE: CPT | Performed by: INTERNAL MEDICINE

## 2025-04-01 PROCEDURE — 80053 COMPREHEN METABOLIC PANEL: CPT | Performed by: INTERNAL MEDICINE

## 2025-04-01 PROCEDURE — 93005 ELECTROCARDIOGRAM TRACING: CPT

## 2025-04-01 RX ORDER — LOSARTAN POTASSIUM 50 MG/1
50 TABLET ORAL DAILY
Qty: 90 TABLET | Refills: 3 | Status: SHIPPED | OUTPATIENT
Start: 2025-04-01

## 2025-04-01 ASSESSMENT — PAIN SCALES - GENERAL: PAINLEVEL_OUTOF10: NO PAIN (0)

## 2025-04-01 NOTE — LETTER
4/1/2025      RE: Vilma Hughes  [address]  [facility] [address]       Dear Colleague,    Thank you for the opportunity to participate in the care of your patient, Vilma Hughes, at the [facility] HEART [facility]. Please see a copy of my visit note below.    CARDIOLOGY CONSULTATION:    Mr. Herring is a delightful 74-year-old woman with a past medical history significant for Ebstein's anomaly.  She was followed by Dr. Cordova, last seen in 2012.  She had been seen by Dr. Cordova at [facility] prior to 2012 and apparently had a recommendation to have tricuspid valve replacement or repair but decided against it.  She reports that she is walking 2 to 4 miles a day and takes her about half an hour to go 2 miles and she reports that the area is hilly she does not feel that she is declined at all.     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  She has 2 adopted kids, 1 of which lives with her.  . She was seen at [facility] in the past and had an MRI of the head in December 2023 that was without contrast, and that showed no concerning findings.      Echo from October 2023 I reviewed personally and she had mild to moderate enlargement of her right ventricle with pretty good function and severe tricuspid valve insufficiency.  Her left ventricular chamber size and function were normal.   She had a zio patch 7/2024 and she had short runs of SVT.      Ms. Herring had a coronary calcium scan 4/2024 and her had a score of zero.  CPX 4/1/125 RER was 1.14 and she went 97% of predicted.      PAST MEDICAL HISTORY:  Past Medical History:   Diagnosis Date     Atypical nevus 8/16     Diffuse cystic mastopathy      Ebstein's anomaly      Palpitations      Spider veins      Thoracic or lumbosacral neuritis or radiculitis, unspecified     L leg neuropathy     Tubular adenoma 1/16       CURRENT MEDICATIONS:  Current Outpatient  Medications   Medication Sig Dispense Refill     amoxicillin (AMOXIL) 500 MG capsule 4  Capsules PO one to two hours before dental procedure 20 capsule 1     calcium carbonate (OS-RIKA) 500 MG tablet Take 1 tablet by mouth daily       multivitamin w/minerals (THERA-VIT-M) tablet Take 1 tablet by mouth daily       Probiotic Product (FORTIFY 50 BILLION PROBIOT 50+ PO)        SUMAtriptan (IMITREX) 50 MG tablet Take 1 tablet (50 mg) by mouth at onset of headache for migraine May repeat dose in 2 hours. 18 tablet 11     Zinc 25 MG TABS          PAST SURGICAL HISTORY:  Past Surgical History:   Procedure Laterality Date     COLONOSCOPY N/A 1/29/2016    Procedure: COMBINED COLONOSCOPY, SINGLE OR MULTIPLE BIOPSY/POLYPECTOMY BY BIOPSY;  Surgeon: Margarette Millan MD;  Location: [facility]     HC BIOPSY OF BREAST, OPEN INCISIONAL       HC REMOVAL OF TONSILS,<11 Y/O      Tonsils <14y.o.     PELVIS LAPAROSCOPY,DX         ALLERGIES  Fentanyl    FAMILY HX:  Family History   Problem Relation Age of Onset     Alzheimer Disease Mother      Arthritis Mother         RA     Hypertension Mother      Cancer Father         brain tumor     C.A.D. Father         MI@70     Cancer - colorectal Sister 62     Hyperthyroidism Sister        SOCIAL HX:  Social History     Socioeconomic History     Marital status:    Tobacco Use     Smoking status: Never     Passive exposure: Never     Smokeless tobacco: Never   Vaping Use     Vaping status: Never Used   Substance and Sexual Activity     Alcohol use: Yes     Comment: occasionally     Drug use: No     Sexual activity: Yes     Partners: Male     Social Drivers of Health     Financial Resource Strain: Low Risk  (5/16/2024)    Financial Resource Strain      Within the past 12 months, have you or your family members you live with been unable to get utilities (heat, electricity) when it was really needed?: No   Food Insecurity: Low Risk  (5/16/2024)    Food Insecurity      Within the past 12  months, did you worry that your food would run out before you got money to buy more?: No      Within the past 12 months, did the food you bought just not last and you didn t have money to get more?: No   Transportation Needs: Low Risk  (5/16/2024)    Transportation Needs      Within the past 12 months, has lack of transportation kept you from medical appointments, getting your medicines, non-medical meetings or appointments, work, or from getting things that you need?: No   Physical Activity: Sufficiently Active (5/16/2024)    Exercise Vital Sign      Days of Exercise per Week: 7 days      Minutes of Exercise per Session: 40 min   Stress: No Stress Concern Present (5/16/2024)    [employer] - Occupational Stress Questionnaire      Feeling of Stress : Not at all   Social Connections: Unknown (5/16/2024)    Social Connection and Isolation Panel [NHANES]      Frequency of Social Gatherings with Friends and Family: Patient declined   Interpersonal Safety: Low Risk  (5/16/2024)    Interpersonal Safety      Do you feel physically and emotionally safe where you currently live?: Yes      Within the past 12 months, have you been hit, slapped, kicked or otherwise physically hurt by someone?: No      Within the past 12 months, have you been humiliated or emotionally abused in other ways by your partner or ex-partner?: No   Housing Stability: Low Risk  (5/16/2024)    Housing Stability      Do you have housing? : Yes      Are you worried about losing your housing?: No       ROS:  Constitutional: No fever, chills, or sweats. No weight gain/loss.   ENT: No visual disturbance, ear ache, epistaxis, sore throat.   Allergies/Immunologic: Negative.   Respiratory: No cough, hemoptysis.   Cardiovascular: As per HPI.   GI: No nausea, vomiting, hematemesis, melena, or hematochezia.   : No urinary frequency, dysuria, or hematuria.   Integument: Negative.   Psychiatric: Negative.   Neuro: Negative.  "  Endocrinology: Negative.   Musculoskeletal: No myalgia.    VITAL SIGNS:  /82 (BP Location: Right arm, Patient Position: Chair, Cuff Size: Adult Regular)   Pulse 90   Wt 65.7 kg (144 lb 14.4 oz)   LMP 08/18/2004 (Approximate)   SpO2 100%   BMI 24.86 kg/m    Body mass index is 24.86 kg/m .  Wt Readings from Last 2 Encounters:   04/01/25 65.7 kg (144 lb 14.4 oz)   04/01/25 64.9 kg (143 lb 1.3 oz)       PHYSICAL EXAM  Vilma Hughes IS A 74 year old female.in no acute distress.  HEENT: Unremarkable.  Neck: JVP normal.   Lungs: CTA.  Cor: RRR. Normal S1 and S2.  Holosystolic murmur - soft.  Abd: Soft  Extremities: No C/C/E.    Neuro: Grossly intact.    LABS    Lab Results   Component Value Date    WBC 4.7 09/21/2023    WBC 4.9 08/19/2016     Lab Results   Component Value Date    RBC 4.72 09/21/2023    RBC 4.31 08/19/2016     Lab Results   Component Value Date    HGB 14.6 09/21/2023    HGB 13.3 08/19/2016     Lab Results   Component Value Date    HCT 44.3 09/21/2023    HCT 40.7 08/19/2016     No components found for: \"MCT\"  Lab Results   Component Value Date    MCV 94 09/21/2023    MCV 94 08/19/2016     Lab Results   Component Value Date    MCH 30.9 09/21/2023    MCH 30.9 08/19/2016     Lab Results   Component Value Date    MCHC 33.0 09/21/2023    MCHC 32.7 08/19/2016     Lab Results   Component Value Date    RDW 12.7 09/21/2023    RDW 12.9 08/19/2016     Lab Results   Component Value Date     09/21/2023     08/19/2016      Recent Labs   Lab Test 04/01/25  0946 05/16/24  0921    142   POTASSIUM 4.1 4.8   CHLORIDE 107 108*   CO2 23 22   ANIONGAP 12 12   * 108*   BUN 11.8 15.9   CR 0.75 0.71   RIKA 9.3 9.4     Recent Labs   Lab Test 05/16/24  0921 09/21/23  0829   CHOL 237* 220*   HDL 78 74   * 131*   TRIG 66 76   NHDL 159* 146*        ASSESSMENT AND PLAN:  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 CPX " 3/2024 - 97% of predicted.   Dysphagia  Speech change with negative MRI of the head without contrast, 12/2023  Hyperlipidemia  HTN  RBBB -  ms    It was a pleasure to be involved in the care of Mrs. Hughes reviewed her history and symptoms in detail and testing as outlined personally. Last echo 10/2023 so technically should repeat this year.  She did well on the CPX, however, going 97% of predicted. Her BP and HR increased with exercise.      Her BP is high today and at home.  Willing to start a medication so discussed options and will start losartan 50 mg daily.  Will check CMP in a week.  She will let us know if still high (>120/80) after starting meds.     Ca score zero when checked 4/2024.  5/2024.  No concerning cardiac symptoms.  No evidence of ischemia on the the CPX 3/2025.    Plan echo and follow up this year ([facility] or [place] preferred; needs to be congenital). CMP when convenient.  Could see Dr. Collazo after echo if she decides to get it done in [place].   Then plan likely follow up a year after that with an echo.      She should be of acceptable risk from a cardiac standpoint to undergo surgery on her eyes.       She understands and agrees with the plan as outlined all questions were answered it was a pleasure to be involved in her care please do not hesitate to contact me with any questions or concerns     RODRIGO Arreaga MD   There was 45 minutes face-to-face, documentation and review of records on the day of the visit    The longitudinal plan of care for the diagnosis(es)/condition(s) as documented were addressed during this visit. Due to the added complexity in care, I will continue to support Vilma in the subsequent management and with ongoing continuity of care.       Please do not hesitate to contact me if you have any questions/concerns.     Sincerely,     Sondra Arreaga MD

## 2025-04-01 NOTE — NURSING NOTE
Chief Complaint   Patient presents with    Follow Up      74 year old female with history of Ebstein anomaly presenting for evaluation.         Vitals were taken, medications reconciled     Jackson Rowe, Clinic Assistant     1:30 PM

## 2025-04-01 NOTE — PROGRESS NOTES
CARDIOLOGY CONSULTATION:    Mr. Herring is a delightful 74-year-old woman with a past medical history significant for Ebstein's anomaly.  She was followed by Dr. Cordova, last seen in 2012.  She had been seen by Dr. Cordova at [facility] prior to 2012 and apparently had a recommendation to have tricuspid valve replacement or repair but decided against it.  She reports that she is walking 2 to 4 miles a day and takes her about half an hour to go 2 miles and she reports that the area is hilly she does not feel that she is declined at all.     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  She has 2 adopted kids, 1 of which lives with her.  . She was seen at [facility] in the past and had an MRI of the head in December 2023 that was without contrast, and that showed no concerning findings.      Echo from October 2023 I reviewed personally and she had mild to moderate enlargement of her right ventricle with pretty good function and severe tricuspid valve insufficiency.  Her left ventricular chamber size and function were normal.   She had a zio patch 7/2024 and she had short runs of SVT.      Ms. Herring had a coronary calcium scan 4/2024 and her had a score of zero.  CPX 4/1/125 RER was 1.14 and she went 97% of predicted.      PAST MEDICAL HISTORY:  Past Medical History:   Diagnosis Date    Atypical nevus 8/16    Diffuse cystic mastopathy     Ebstein's anomaly     Palpitations     Spider veins     Thoracic or lumbosacral neuritis or radiculitis, unspecified     L leg neuropathy    Tubular adenoma 1/16       CURRENT MEDICATIONS:  Current Outpatient Medications   Medication Sig Dispense Refill    amoxicillin (AMOXIL) 500 MG capsule 4  Capsules PO one to two hours before dental procedure 20 capsule 1    calcium carbonate (OS-RIKA) 500 MG tablet Take 1 tablet by mouth daily      multivitamin w/minerals (THERA-VIT-M) tablet Take 1 tablet by mouth daily      Probiotic Product (FORTIFY 50 BILLION PROBIOT 50+  PO)       SUMAtriptan (IMITREX) 50 MG tablet Take 1 tablet (50 mg) by mouth at onset of headache for migraine May repeat dose in 2 hours. 18 tablet 11    Zinc 25 MG TABS          PAST SURGICAL HISTORY:  Past Surgical History:   Procedure Laterality Date    COLONOSCOPY N/A 1/29/2016    Procedure: COMBINED COLONOSCOPY, SINGLE OR MULTIPLE BIOPSY/POLYPECTOMY BY BIOPSY;  Surgeon: Margarette Millan MD;  Location: [facility]    HC BIOPSY OF BREAST, OPEN INCISIONAL      HC REMOVAL OF TONSILS,<11 Y/O      Tonsils <14y.o.    PELVIS LAPAROSCOPY,DX         ALLERGIES  Fentanyl    FAMILY HX:  Family History   Problem Relation Age of Onset    Alzheimer Disease Mother     Arthritis Mother         RA    Hypertension Mother     Cancer Father         brain tumor    C.A.D. Father         MI@70    Cancer - colorectal Sister 62    Hyperthyroidism Sister        SOCIAL HX:  Social History     Socioeconomic History    Marital status:    Tobacco Use    Smoking status: Never     Passive exposure: Never    Smokeless tobacco: Never   Vaping Use    Vaping status: Never Used   Substance and Sexual Activity    Alcohol use: Yes     Comment: occasionally    Drug use: No    Sexual activity: Yes     Partners: Male     Social Drivers of Health     Financial Resource Strain: Low Risk  (5/16/2024)    Financial Resource Strain     Within the past 12 months, have you or your family members you live with been unable to get utilities (heat, electricity) when it was really needed?: No   Food Insecurity: Low Risk  (5/16/2024)    Food Insecurity     Within the past 12 months, did you worry that your food would run out before you got money to buy more?: No     Within the past 12 months, did the food you bought just not last and you didn t have money to get more?: No   Transportation Needs: Low Risk  (5/16/2024)    Transportation Needs     Within the past 12 months, has lack of transportation kept you from medical appointments, getting your medicines,  non-medical meetings or appointments, work, or from getting things that you need?: No   Physical Activity: Sufficiently Active (5/16/2024)    Exercise Vital Sign     Days of Exercise per Week: 7 days     Minutes of Exercise per Session: 40 min   Stress: No Stress Concern Present (5/16/2024)    [employer] - Occupational Stress Questionnaire     Feeling of Stress : Not at all   Social Connections: Unknown (5/16/2024)    Social Connection and Isolation Panel [NHANES]     Frequency of Social Gatherings with Friends and Family: Patient declined   Interpersonal Safety: Low Risk  (5/16/2024)    Interpersonal Safety     Do you feel physically and emotionally safe where you currently live?: Yes     Within the past 12 months, have you been hit, slapped, kicked or otherwise physically hurt by someone?: No     Within the past 12 months, have you been humiliated or emotionally abused in other ways by your partner or ex-partner?: No   Housing Stability: Low Risk  (5/16/2024)    Housing Stability     Do you have housing? : Yes     Are you worried about losing your housing?: No       ROS:  Constitutional: No fever, chills, or sweats. No weight gain/loss.   ENT: No visual disturbance, ear ache, epistaxis, sore throat.   Allergies/Immunologic: Negative.   Respiratory: No cough, hemoptysis.   Cardiovascular: As per HPI.   GI: No nausea, vomiting, hematemesis, melena, or hematochezia.   : No urinary frequency, dysuria, or hematuria.   Integument: Negative.   Psychiatric: Negative.   Neuro: Negative.   Endocrinology: Negative.   Musculoskeletal: No myalgia.    VITAL SIGNS:  /82 (BP Location: Right arm, Patient Position: Chair, Cuff Size: Adult Regular)   Pulse 90   Wt 65.7 kg (144 lb 14.4 oz)   LMP 08/18/2004 (Approximate)   SpO2 100%   BMI 24.86 kg/m    Body mass index is 24.86 kg/m .  Wt Readings from Last 2 Encounters:   04/01/25 65.7 kg (144 lb 14.4 oz)   04/01/25 64.9 kg (143 lb 1.3 oz)  "      PHYSICAL EXAM  Vilma Hughes IS A 74 year old female.in no acute distress.  HEENT: Unremarkable.  Neck: JVP normal.   Lungs: CTA.  Cor: RRR. Normal S1 and S2.  Holosystolic murmur - soft.  Abd: Soft  Extremities: No C/C/E.    Neuro: Grossly intact.    LABS    Lab Results   Component Value Date    WBC 4.7 09/21/2023    WBC 4.9 08/19/2016     Lab Results   Component Value Date    RBC 4.72 09/21/2023    RBC 4.31 08/19/2016     Lab Results   Component Value Date    HGB 14.6 09/21/2023    HGB 13.3 08/19/2016     Lab Results   Component Value Date    HCT 44.3 09/21/2023    HCT 40.7 08/19/2016     No components found for: \"MCT\"  Lab Results   Component Value Date    MCV 94 09/21/2023    MCV 94 08/19/2016     Lab Results   Component Value Date    MCH 30.9 09/21/2023    MCH 30.9 08/19/2016     Lab Results   Component Value Date    MCHC 33.0 09/21/2023    MCHC 32.7 08/19/2016     Lab Results   Component Value Date    RDW 12.7 09/21/2023    RDW 12.9 08/19/2016     Lab Results   Component Value Date     09/21/2023     08/19/2016      Recent Labs   Lab Test 04/01/25  0946 05/16/24  0921    142   POTASSIUM 4.1 4.8   CHLORIDE 107 108*   CO2 23 22   ANIONGAP 12 12   * 108*   BUN 11.8 15.9   CR 0.75 0.71   RIKA 9.3 9.4     Recent Labs   Lab Test 05/16/24  0921 09/21/23  0829   CHOL 237* 220*   HDL 78 74   * 131*   TRIG 66 76   NHDL 159* 146*        ASSESSMENT AND PLAN:  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 CPX 3/2024 - 97% of predicted.   Dysphagia  Speech change with negative MRI of the head without contrast, 12/2023  Hyperlipidemia  HTN  RBBB -  ms    It was a pleasure to be involved in the care of Mrs. Hughes reviewed her history and symptoms in detail and testing as outlined personally. Last echo 10/2023 so technically should repeat this year.  She did well on the CPX, however, going 97% of predicted. Her " BP and HR increased with exercise.      Her BP is high today and at home.  Willing to start a medication so discussed options and will start losartan 50 mg daily.  Will check CMP in a week.  She will let us know if still high (>120/80) after starting meds.     Ca score zero when checked 4/2024.  5/2024.  No concerning cardiac symptoms.  No evidence of ischemia on the the CPX 3/2025.    Plan echo and follow up this year ([facility] or [place] preferred; needs to be congenital). CMP when convenient.  Could see Dr. Collazo after echo if she decides to get it done in [place].   Then plan likely follow up a year after that with an echo.      She should be of acceptable risk from a cardiac standpoint to undergo surgery on her eyes.       She understands and agrees with the plan as outlined all questions were answered it was a pleasure to be involved in her care please do not hesitate to contact me with any questions or concerns     RODRIGO Arreaga MD   There was 45 minutes face-to-face, documentation and review of records on the day of the visit    The longitudinal plan of care for the diagnosis(es)/condition(s) as documented were addressed during this visit. Due to the added complexity in care, I will continue to support Vilma in the subsequent management and with ongoing continuity of care.

## 2025-04-02 LAB
ATRIAL RATE - MUSE: 90 BPM
CARDIOPULMONARY ANAEROBIC THRESHOLD PREDICTED PEAK: 86 %
CARDIOPULMONARY ANAEROBIC THRESHOLD VO2: 19.7 ML/KG/MIN
CARDIOPULMONARY BLOOD PRESSURE REST: NORMAL MMHG
CARDIOPULMONARY BREATHING RESERVE REST: 78.5
CARDIOPULMONARY BREATHING RESERVE V02MAX: 14
CARDIOPULMONARY CO2 OUTPUT REST: 341 ML/MIN
CARDIOPULMONARY CO2 OUTPUT VO2MAX: 1630 ML/MIN
CARDIOPULMONARY FEV 1.0 (L) ACTUAL: 2.01
CARDIOPULMONARY FEV 1.0 (L) PRECENT: 91 %
CARDIOPULMONARY FEV 1.0 (L) PREDICTED: 2.21
CARDIOPULMONARY FEV 1.0 FVC (%) ACTUAL: 82.9
CARDIOPULMONARY FEV 1.0 FVC (%) PERCENT: 110 %
CARDIOPULMONARY FEV 1.0 FVC (%) PREDICTED: 75.5
CARDIOPULMONARY FUNCTIONAL CAPACITY MAX ML/KG/MIN: 22.2 ML/KG/MIN
CARDIOPULMONARY FUNCTIONAL CAPACITY PERCENT: 97 %
CARDIOPULMONARY FUNCTIONAL CAPACITY PREDICTED: 23 ML/KG/MIN
CARDIOPULMONARY FVC (L) ACTUAL: 2.42
CARDIOPULMONARY FVC (L) PERCENT: 83 %
CARDIOPULMONARY FVC (L) PREDICTED: 2.93
CARDIOPULMONARY HEART RATE REST: 78 BPM
CARDIOPULMONARY MET'S REST: 2
CARDIOPULMONARY MINUTE VENTILATION REST: 15.1 L/MIN
CARDIOPULMONARY MINUTE VENTILATION VO2MAX: 59.9 L/MIN
CARDIOPULMONARY MYOCARDIAC O2 DEMAND MAX: NORMAL
CARDIOPULMONARY OXYGEN CONSUMPTION REST: 6.8 ML/KG/MIN
CARDIOPULMONARY OXYGEN CONSUMPTION VO2MAX: 22.2 ML/KG/MIN
CARDIOPULMONARY OXYGEN PULSE REST: 6 ML/BEAT
CARDIOPULMONARY OXYGEN PULSE VO2MAX: 9.4 ML/BEAT
CARDIOPULMONARY OXYGEN SATURATION- OXIMETRY REST: 100 %
CARDIOPULMONARY OXYGEN SATURATION- OXIMETRY VO2MAX: 100 %
CARDIOPULMONARY PET C02 REST: 30
CARDIOPULMONARY PET C02 VO2MAX: 30
CARDIOPULMONARY PET02 REST: 108
CARDIOPULMONARY PET02 V02 MAX: 117
CARDIOPULMONARY RER: 1.14
CARDIOPULMONARY RESPIRALORY EXCHANGE RATIO VO2MAX: 1.14
CARDIOPULMONARY RESPIRALORY EXCHANGE RATIO: 0.77
CARDIOPULMONARY RESPIRATORY RATE REST: 29 BR/MIN
CARDIOPULMONARY RESPIRATORY RATE VO2MAX: 38 BR/MIN
CARDIOPULMONARY STRESS BASE 1 BP MMHG: NORMAL MMHG
CARDIOPULMONARY STRESS BASE 1 BPA: 143 BPM
CARDIOPULMONARY STRESS BASE 1 SPO2: 100 % SPO2
CARDIOPULMONARY STRESS BASE 1 TIME: 1 MINS
CARDIOPULMONARY STRESS BASE 2 BP MMHG: NORMAL MMHG
CARDIOPULMONARY STRESS BASE 2 BPA: 108 BPM
CARDIOPULMONARY STRESS BASE 2 SPO2: 100 % SPO2
CARDIOPULMONARY STRESS BASE 2 TIME: 3 MINS
CARDIOPULMONARY STRESS BASE 3 BP MMHG: NORMAL MMHG
CARDIOPULMONARY STRESS BASE 3 BPA: 92 BPM
CARDIOPULMONARY STRESS BASE 3 SPO2: 100 % SPO2
CARDIOPULMONARY STRESS BASE 3 TIME: 9 MINS
CARDIOPULMONARY STRESS PHASE 1 BP MMHG: NORMAL MMHG
CARDIOPULMONARY STRESS PHASE 1 BPM: 110 BPM
CARDIOPULMONARY STRESS PHASE 1 SPO2: 100 % SPO2
CARDIOPULMONARY STRESS PHASE 1 TIME: 2 MINS
CARDIOPULMONARY STRESS PHASE 2 BP MMHG: NORMAL MMHG
CARDIOPULMONARY STRESS PHASE 2 BPM: 129 BPM
CARDIOPULMONARY STRESS PHASE 2 SPO2: 100 % SPO2
CARDIOPULMONARY STRESS PHASE 2 TIME: 4 MINS
CARDIOPULMONARY STRESS PHASE 3 BPM: 146 BPM
CARDIOPULMONARY STRESS PHASE 3 SPO2: 100 % SPO2
CARDIOPULMONARY STRESS PHASE 3 TIME: 6 MINS
CARDIOPULMONARY STRESS PHASE 4 BPM: 153 BPM
CARDIOPULMONARY STRESS PHASE 4 SPO2: 100 % SPO2
CARDIOPULMONARY STRESS PHASE 4 TIME SEC: 20 SEC
CARDIOPULMONARY STRESS PHASE 4 TIME: 7 MINS
CARDIOPULMONARY SVC (L) ACTUAL: 2.3
CARDIOPULMONARY SVC (L) PERCENT: 78 %
CARDIOPULMONARY SVC (L) PREDICTED: 2.93
CARDIOPULMONARY TIDAL VOLUME REST: 529 ML
CARDIOPULMONARY TIDAL VOLUME VO2MAX: 1572 ML
CARDIOPULMONARY VE/VCO2 SLOPE: 34.07
CARDIOPULMONARY VENTILATORY EQUIVALENT 02 REST: 34
CARDIOPULMONARY VENTILATORY EQUIVALENT 02 V02: 41
CARDIOPULMONARY VENTILATORY EQUIVALENT C02 REST: 44
CARDIOPULMONARY VENTILATORY EQUIVALENT C02 SLOPE VO2MAX: 34.07
CARDIOPULMONARY VENTILATORY EQUIVALENT C02 VO2MAX: 37
CV STRESS MAX HR HE: 153
DIASTOLIC BLOOD PRESSURE - MUSE: NORMAL MMHG
INTERPRETATION ECG - MUSE: NORMAL
P AXIS - MUSE: 53 DEGREES
PR INTERVAL - MUSE: 154 MS
PREDICTED VO2MAX: 23
QRS DURATION - MUSE: 142 MS
QT - MUSE: 398 MS
QTC - MUSE: 486 MS
R AXIS - MUSE: -59 DEGREES
RATED PERCEIVED EXERTION: 18
STRESS ANGINA INDEX: 0
STRESS ECHO BASELINE BP: NORMAL MMHG
STRESS ECHO BASELINE HR: 82 BPM
STRESS ECHO CALCULATED PERCENT HR: 103 %
STRESS ECHO LAST STRESS BP: NORMAL MMHG
STRESS ECHO POST ESTIMATED WORKLOAD: 6.3 METS
STRESS ECHO POST EXERCISE DUR MIN: 7 MIN
STRESS ECHO POST EXERCISE DUR SEC: 20 SEC
STRESS ECHO TARGET HR: 148
SYSTOLIC BLOOD PRESSURE - MUSE: NORMAL MMHG
T AXIS - MUSE: 25 DEGREES
VENTRICULAR RATE- MUSE: 90 BPM

## 2025-04-03 ENCOUNTER — MYC MEDICAL ADVICE (OUTPATIENT)
Dept: INTERNAL MEDICINE | Facility: CLINIC | Age: 75
End: 2025-04-03
Payer: COMMERCIAL

## 2025-04-07 ENCOUNTER — TELEPHONE (OUTPATIENT)
Dept: CARDIOLOGY | Facility: CLINIC | Age: 75
End: 2025-04-07
Payer: COMMERCIAL

## 2025-04-07 ENCOUNTER — MYC MEDICAL ADVICE (OUTPATIENT)
Dept: CARDIOLOGY | Facility: CLINIC | Age: 75
End: 2025-04-07
Payer: COMMERCIAL

## 2025-04-07 NOTE — TELEPHONE ENCOUNTER
Pt left a message stating that she had a CMP drawn in 4/1 and wants to know if she needs to have drawn again.

## 2025-04-10 ENCOUNTER — HOSPITAL ENCOUNTER (OUTPATIENT)
Dept: CARDIOLOGY | Facility: CLINIC | Age: 75
Discharge: HOME | End: 2025-04-10
Attending: INTERNAL MEDICINE
Payer: COMMERCIAL

## 2025-04-10 ENCOUNTER — LAB (OUTPATIENT)
Dept: LAB | Facility: CLINIC | Age: 75
End: 2025-04-10
Attending: INTERNAL MEDICINE
Payer: COMMERCIAL

## 2025-04-10 DIAGNOSIS — Q22.5 EBSTEIN'S ANOMALY: ICD-10-CM

## 2025-04-10 DIAGNOSIS — I10 ESSENTIAL HYPERTENSION: ICD-10-CM

## 2025-04-10 DIAGNOSIS — Q24.9 ADULT CONGENITAL HEART DISEASE: ICD-10-CM

## 2025-04-10 DIAGNOSIS — I07.1 TRICUSPID VALVE INSUFFICIENCY, UNSPECIFIED ETIOLOGY: ICD-10-CM

## 2025-04-10 LAB
ALBUMIN SERPL BCG-MCNC: 4.5 G/DL (ref 3.5–5.2)
ALP SERPL-CCNC: 124 U/L (ref 40–150)
ALT SERPL W P-5'-P-CCNC: 14 U/L (ref 0–50)
ANION GAP SERPL CALCULATED.3IONS-SCNC: 12 MMOL/L (ref 7–15)
AST SERPL W P-5'-P-CCNC: 18 U/L (ref 0–45)
BILIRUB SERPL-MCNC: 0.4 MG/DL
BUN SERPL-MCNC: 14.3 MG/DL (ref 8–23)
CALCIUM SERPL-MCNC: 9.4 MG/DL (ref 8.8–10.4)
CHLORIDE SERPL-SCNC: 105 MMOL/L (ref 98–107)
CREAT SERPL-MCNC: 0.76 MG/DL (ref 0.51–0.95)
EGFRCR SERPLBLD CKD-EPI 2021: 83 ML/MIN/1.73M2
GLUCOSE SERPL-MCNC: 112 MG/DL (ref 70–99)
HCO3 SERPL-SCNC: 23 MMOL/L (ref 22–29)
POTASSIUM SERPL-SCNC: 4.2 MMOL/L (ref 3.4–5.3)
PROT SERPL-MCNC: 7.3 G/DL (ref 6.4–8.3)
SODIUM SERPL-SCNC: 140 MMOL/L (ref 135–145)

## 2025-04-10 PROCEDURE — 93320 DOPPLER ECHO COMPLETE: CPT

## 2025-04-10 PROCEDURE — 93325 DOPPLER ECHO COLOR FLOW MAPG: CPT

## 2025-04-16 ENCOUNTER — PATIENT OUTREACH (OUTPATIENT)
Dept: CARE COORDINATION | Facility: CLINIC | Age: 75
End: 2025-04-16
Payer: COMMERCIAL

## 2025-04-30 ENCOUNTER — PATIENT OUTREACH (OUTPATIENT)
Dept: CARE COORDINATION | Facility: CLINIC | Age: 75
End: 2025-04-30
Payer: COMMERCIAL

## 2025-06-04 ENCOUNTER — MYC MEDICAL ADVICE (OUTPATIENT)
Dept: CARDIOLOGY | Facility: CLINIC | Age: 75
End: 2025-06-04
Payer: COMMERCIAL

## 2025-06-09 ENCOUNTER — PATIENT OUTREACH (OUTPATIENT)
Dept: INTERNAL MEDICINE | Facility: CLINIC | Age: 75
End: 2025-06-09
Payer: COMMERCIAL

## 2025-06-09 NOTE — TELEPHONE ENCOUNTER
Patient Quality Outreach    Patient is due for the following:   Depression  -  PHQ-A needed  Physical Annual Wellness Visit      Topic Date Due    COVID-19 Vaccine (8 - 2024-25 season) 04/20/2025     Fall Risk assess     Action(s) Taken:   Schedule a Annual Wellness Visit    Type of outreach:    Sent [employer] message.    Questions for provider review:    None         Hazel Chappell, LPN  Chart routed to self.

## 2025-07-05 ENCOUNTER — HEALTH MAINTENANCE LETTER (OUTPATIENT)
Age: 75
End: 2025-07-05